# Patient Record
Sex: MALE | Race: BLACK OR AFRICAN AMERICAN | Employment: FULL TIME | URBAN - METROPOLITAN AREA
[De-identification: names, ages, dates, MRNs, and addresses within clinical notes are randomized per-mention and may not be internally consistent; named-entity substitution may affect disease eponyms.]

---

## 2017-02-13 ENCOUNTER — TELEPHONE (OUTPATIENT)
Dept: INTERNAL MEDICINE CLINIC | Facility: CLINIC | Age: 42
End: 2017-02-13

## 2017-02-21 ENCOUNTER — OFFICE VISIT (OUTPATIENT)
Dept: INTERNAL MEDICINE CLINIC | Facility: CLINIC | Age: 42
End: 2017-02-21

## 2017-02-21 VITALS
HEART RATE: 94 BPM | OXYGEN SATURATION: 98 % | TEMPERATURE: 98 F | HEIGHT: 75 IN | WEIGHT: 315 LBS | BODY MASS INDEX: 39.17 KG/M2 | SYSTOLIC BLOOD PRESSURE: 136 MMHG | RESPIRATION RATE: 16 BRPM | DIASTOLIC BLOOD PRESSURE: 82 MMHG

## 2017-02-21 DIAGNOSIS — R19.8 PAIN ASSOCIATED WITH DEFECATION: ICD-10-CM

## 2017-02-21 DIAGNOSIS — Z00.00 ROUTINE GENERAL MEDICAL EXAMINATION AT A HEALTH CARE FACILITY: Primary | ICD-10-CM

## 2017-02-21 DIAGNOSIS — E11.22 UNCONTROLLED TYPE 2 DIABETES MELLITUS WITH STAGE 3 CHRONIC KIDNEY DISEASE, WITHOUT LONG-TERM CURRENT USE OF INSULIN (HCC): ICD-10-CM

## 2017-02-21 DIAGNOSIS — E11.65 UNCONTROLLED TYPE 2 DIABETES MELLITUS WITH STAGE 3 CHRONIC KIDNEY DISEASE, WITHOUT LONG-TERM CURRENT USE OF INSULIN (HCC): ICD-10-CM

## 2017-02-21 DIAGNOSIS — Z23 NEED FOR VACCINATION FOR STREP PNEUMONIAE: ICD-10-CM

## 2017-02-21 DIAGNOSIS — E66.01 MORBID OBESITY WITH BMI OF 40.0-44.9, ADULT (HCC): ICD-10-CM

## 2017-02-21 DIAGNOSIS — N18.30 UNCONTROLLED TYPE 2 DIABETES MELLITUS WITH STAGE 3 CHRONIC KIDNEY DISEASE, WITHOUT LONG-TERM CURRENT USE OF INSULIN (HCC): ICD-10-CM

## 2017-02-21 PROBLEM — IMO0002 UNCONTROLLED TYPE 2 DIABETES MELLITUS WITH STAGE 3 CHRONIC KIDNEY DISEASE, WITHOUT LONG-TERM CURRENT USE OF INSULIN: Status: ACTIVE | Noted: 2017-02-21

## 2017-02-21 PROCEDURE — 99395 PREV VISIT EST AGE 18-39: CPT | Performed by: INTERNAL MEDICINE

## 2017-02-21 PROCEDURE — 90471 IMMUNIZATION ADMIN: CPT | Performed by: INTERNAL MEDICINE

## 2017-02-21 PROCEDURE — 93000 ELECTROCARDIOGRAM COMPLETE: CPT | Performed by: INTERNAL MEDICINE

## 2017-02-21 PROCEDURE — 90732 PPSV23 VACC 2 YRS+ SUBQ/IM: CPT | Performed by: INTERNAL MEDICINE

## 2017-02-21 NOTE — PROGRESS NOTES
Patient presents with:  Physical      HPI: The pt presents today for male CPX. Doing well. Due for wellness labs. Was diagnoses w/ uncontrolled DM2 w/ Stage 3 CKD, not on insulin, based on previous labs. Due for updated labs.   Due to see his [employer] [facility])                          02/21/2017        PE:  /82 mmHg  Pulse 94  Temp(Src) 98.3 °F (36.8 °C) (Oral)  Resp 16  Ht 75\"  Wt 322 lb 8 oz  BMI 40.31 kg/m2  SpO2 98%  Wt Readings from Last 6 Encounters:  02/21/17 : 322 lb 8 oz  12/12/16 : 321 lb Send to colorectal surgery for eval.  6. RTC 1 month for weight loss f/u. Patient verbally agrees to and understands the plan as outlined above.   Patient was also afforded the time and opportunity to ask questions, which were then answered to the best of

## 2017-03-04 ENCOUNTER — HOSPITAL ENCOUNTER (OUTPATIENT)
Dept: MRI IMAGING | Age: 42
Discharge: HOME | End: 2017-03-04
Attending: ORTHOPAEDIC SURGERY
Payer: COMMERCIAL

## 2017-03-04 DIAGNOSIS — M25.561 RIGHT KNEE PAIN, UNSPECIFIED CHRONICITY: ICD-10-CM

## 2017-03-04 PROCEDURE — 73721 MRI JNT OF LWR EXTRE W/O DYE: CPT

## 2017-03-15 ENCOUNTER — LAB ENCOUNTER (OUTPATIENT)
Dept: LAB | Age: 42
End: 2017-03-15
Attending: INTERNAL MEDICINE
Payer: COMMERCIAL

## 2017-03-15 DIAGNOSIS — E11.22 UNCONTROLLED TYPE 2 DIABETES MELLITUS WITH STAGE 3 CHRONIC KIDNEY DISEASE, WITHOUT LONG-TERM CURRENT USE OF INSULIN (HCC): ICD-10-CM

## 2017-03-15 DIAGNOSIS — E66.01 MORBID OBESITY WITH BMI OF 40.0-44.9, ADULT (HCC): ICD-10-CM

## 2017-03-15 DIAGNOSIS — Z00.00 ROUTINE GENERAL MEDICAL EXAMINATION AT A HEALTH CARE FACILITY: ICD-10-CM

## 2017-03-15 DIAGNOSIS — N18.30 UNCONTROLLED TYPE 2 DIABETES MELLITUS WITH STAGE 3 CHRONIC KIDNEY DISEASE, WITHOUT LONG-TERM CURRENT USE OF INSULIN (HCC): ICD-10-CM

## 2017-03-15 DIAGNOSIS — E11.65 UNCONTROLLED TYPE 2 DIABETES MELLITUS WITH STAGE 3 CHRONIC KIDNEY DISEASE, WITHOUT LONG-TERM CURRENT USE OF INSULIN (HCC): ICD-10-CM

## 2017-03-15 LAB
25-HYDROXYVITAMIN D (TOTAL): 14.2 NG/ML (ref 30–100)
ALBUMIN SERPL-MCNC: 3.9 G/DL (ref 3.5–4.8)
ALP LIVER SERPL-CCNC: 46 U/L (ref 45–117)
ALT SERPL-CCNC: 19 U/L (ref 17–63)
AST SERPL-CCNC: 13 U/L (ref 15–41)
BASOPHILS # BLD AUTO: 0.05 X10(3) UL (ref 0–0.1)
BASOPHILS NFR BLD AUTO: 0.8 %
BILIRUB SERPL-MCNC: 0.7 MG/DL (ref 0.1–2)
BILIRUB UR QL STRIP.AUTO: NEGATIVE
BUN BLD-MCNC: 14 MG/DL (ref 8–20)
CALCIUM BLD-MCNC: 9.3 MG/DL (ref 8.3–10.3)
CHLORIDE: 107 MMOL/L (ref 101–111)
CHOLEST SMN-MCNC: 142 MG/DL (ref ?–200)
CLARITY UR REFRACT.AUTO: CLEAR
CO2: 28 MMOL/L (ref 22–32)
COLOR UR AUTO: YELLOW
CREAT BLD-MCNC: 1.51 MG/DL (ref 0.7–1.3)
CREAT UR-SCNC: 471 MG/DL
EOSINOPHIL # BLD AUTO: 0.14 X10(3) UL (ref 0–0.3)
EOSINOPHIL NFR BLD AUTO: 2.3 %
ERYTHROCYTE [DISTWIDTH] IN BLOOD BY AUTOMATED COUNT: 12.1 % (ref 11.5–16)
EST. AVERAGE GLUCOSE BLD GHB EST-MCNC: 243 MG/DL (ref 68–126)
GLUCOSE BLD-MCNC: 103 MG/DL (ref 70–99)
GLUCOSE UR STRIP.AUTO-MCNC: NEGATIVE MG/DL
HBA1C MFR BLD HPLC: 10.1 % (ref ?–5.7)
HCT VFR BLD AUTO: 43.9 % (ref 37–53)
HDLC SERPL-MCNC: 42 MG/DL (ref 45–?)
HDLC SERPL: 3.38 {RATIO} (ref ?–4.97)
HGB BLD-MCNC: 14.3 G/DL (ref 13–17)
IMMATURE GRANULOCYTE COUNT: 0.02 X10(3) UL (ref 0–1)
IMMATURE GRANULOCYTE RATIO %: 0.3 %
LDLC SERPL CALC-MCNC: 90 MG/DL (ref ?–130)
LEUKOCYTE ESTERASE UR QL STRIP.AUTO: NEGATIVE
LYMPHOCYTES # BLD AUTO: 2.7 X10(3) UL (ref 0.9–4)
LYMPHOCYTES NFR BLD AUTO: 44 %
M PROTEIN MFR SERPL ELPH: 7.5 G/DL (ref 6.1–8.3)
MCH RBC QN AUTO: 29.5 PG (ref 27–33.2)
MCHC RBC AUTO-ENTMCNC: 32.6 G/DL (ref 31–37)
MCV RBC AUTO: 90.7 FL (ref 80–99)
MICROALBUMIN UR-MCNC: 2.65 MG/DL
MICROALBUMIN/CREAT 24H UR-RTO: 5.6 UG/MG (ref ?–30)
MONOCYTES # BLD AUTO: 0.53 X10(3) UL (ref 0.1–0.6)
MONOCYTES NFR BLD AUTO: 8.6 %
NEUTROPHIL ABS PRELIM: 2.69 X10 (3) UL (ref 1.3–6.7)
NEUTROPHILS # BLD AUTO: 2.69 X10(3) UL (ref 1.3–6.7)
NEUTROPHILS NFR BLD AUTO: 44 %
NITRITE UR QL STRIP.AUTO: NEGATIVE
NONHDLC SERPL-MCNC: 100 MG/DL (ref ?–130)
PH UR STRIP.AUTO: 5 [PH] (ref 4.5–8)
PLATELET # BLD AUTO: 274 10(3)UL (ref 150–450)
POTASSIUM SERPL-SCNC: 4.2 MMOL/L (ref 3.6–5.1)
PROT UR STRIP.AUTO-MCNC: NEGATIVE MG/DL
RBC # BLD AUTO: 4.84 X10(6)UL (ref 4.3–5.7)
RBC UR QL AUTO: NEGATIVE
RED CELL DISTRIBUTION WIDTH-SD: 39.8 FL (ref 35.1–46.3)
SODIUM SERPL-SCNC: 143 MMOL/L (ref 136–144)
SP GR UR STRIP.AUTO: 1.03 (ref 1–1.03)
TRIGLYCERIDES: 48 MG/DL (ref ?–150)
TSI SER-ACNC: 0.65 MIU/ML (ref 0.35–5.5)
UROBILINOGEN UR STRIP.AUTO-MCNC: <2 MG/DL
VLDL: 10 MG/DL (ref 5–40)
WBC # BLD AUTO: 6.1 X10(3) UL (ref 4–13)

## 2017-03-15 PROCEDURE — 80053 COMPREHEN METABOLIC PANEL: CPT

## 2017-03-15 PROCEDURE — 85025 COMPLETE CBC W/AUTO DIFF WBC: CPT

## 2017-03-15 PROCEDURE — 83036 HEMOGLOBIN GLYCOSYLATED A1C: CPT

## 2017-03-15 PROCEDURE — 82306 VITAMIN D 25 HYDROXY: CPT

## 2017-03-15 PROCEDURE — 82043 UR ALBUMIN QUANTITATIVE: CPT

## 2017-03-15 PROCEDURE — 80061 LIPID PANEL: CPT

## 2017-03-15 PROCEDURE — 84443 ASSAY THYROID STIM HORMONE: CPT

## 2017-03-15 PROCEDURE — 36415 COLL VENOUS BLD VENIPUNCTURE: CPT

## 2017-03-15 PROCEDURE — 81003 URINALYSIS AUTO W/O SCOPE: CPT

## 2017-03-15 PROCEDURE — 82570 ASSAY OF URINE CREATININE: CPT

## 2017-03-16 DIAGNOSIS — N18.30 UNCONTROLLED TYPE 2 DIABETES MELLITUS WITH STAGE 3 CHRONIC KIDNEY DISEASE, WITHOUT LONG-TERM CURRENT USE OF INSULIN (HCC): ICD-10-CM

## 2017-03-16 DIAGNOSIS — Z51.81 THERAPEUTIC DRUG MONITORING: ICD-10-CM

## 2017-03-16 DIAGNOSIS — E66.01 MORBID OBESITY WITH BMI OF 40.0-44.9, ADULT (HCC): Primary | ICD-10-CM

## 2017-03-16 DIAGNOSIS — E11.65 UNCONTROLLED TYPE 2 DIABETES MELLITUS WITH STAGE 3 CHRONIC KIDNEY DISEASE, WITHOUT LONG-TERM CURRENT USE OF INSULIN (HCC): ICD-10-CM

## 2017-03-16 DIAGNOSIS — E11.22 UNCONTROLLED TYPE 2 DIABETES MELLITUS WITH STAGE 3 CHRONIC KIDNEY DISEASE, WITHOUT LONG-TERM CURRENT USE OF INSULIN (HCC): ICD-10-CM

## 2017-03-16 RX ORDER — PHENTERMINE HYDROCHLORIDE 37.5 MG/1
37.5 TABLET ORAL
Qty: 30 TABLET | Refills: 0 | Status: SHIPPED | OUTPATIENT
Start: 2017-03-16 | End: 2017-05-30

## 2017-03-16 NOTE — PROGRESS NOTES
Script for Phentermine printed for weight loss. Patient will be seeing Socrates zarco at [facility] for eval and referral placed. Catracho Logan. Olga Gomez MD  Diplomate, American Board of Internal Medicine  [facility]  [address]

## 2017-04-25 ENCOUNTER — TELEPHONE (OUTPATIENT)
Dept: INTERNAL MEDICINE CLINIC | Facility: CLINIC | Age: 42
End: 2017-04-25

## 2017-04-25 NOTE — TELEPHONE ENCOUNTER
Called patient regarding today's missed appointment. Patient stated he had a work emergency come up last minute. Explained our 24-hour cancellation notice and stated that this will still be considered a NO SHOW.   Being that this is his third no show within

## 2017-05-08 ENCOUNTER — OFFICE VISIT (OUTPATIENT)
Dept: SURGERY | Facility: CLINIC | Age: 42
End: 2017-05-08

## 2017-05-08 VITALS
WEIGHT: 313 LBS | HEIGHT: 75 IN | BODY MASS INDEX: 38.92 KG/M2 | HEART RATE: 94 BPM | SYSTOLIC BLOOD PRESSURE: 165 MMHG | DIASTOLIC BLOOD PRESSURE: 72 MMHG

## 2017-05-08 DIAGNOSIS — E11.65 UNCONTROLLED TYPE 2 DIABETES MELLITUS WITH STAGE 3 CHRONIC KIDNEY DISEASE, WITHOUT LONG-TERM CURRENT USE OF INSULIN (HCC): ICD-10-CM

## 2017-05-08 DIAGNOSIS — N18.30 UNCONTROLLED TYPE 2 DIABETES MELLITUS WITH STAGE 3 CHRONIC KIDNEY DISEASE, WITHOUT LONG-TERM CURRENT USE OF INSULIN (HCC): ICD-10-CM

## 2017-05-08 DIAGNOSIS — E66.01 MORBID OBESITY WITH BMI OF 40.0-44.9, ADULT (HCC): ICD-10-CM

## 2017-05-08 DIAGNOSIS — K60.0 ACUTE POSTERIOR ANAL FISSURE: Primary | ICD-10-CM

## 2017-05-08 DIAGNOSIS — K62.89 ANAL PAIN: ICD-10-CM

## 2017-05-08 DIAGNOSIS — K60.0 ACUTE ANTERIOR ANAL FISSURE: ICD-10-CM

## 2017-05-08 DIAGNOSIS — E11.22 UNCONTROLLED TYPE 2 DIABETES MELLITUS WITH STAGE 3 CHRONIC KIDNEY DISEASE, WITHOUT LONG-TERM CURRENT USE OF INSULIN (HCC): ICD-10-CM

## 2017-05-08 DIAGNOSIS — K59.04 FUNCTIONAL CONSTIPATION: ICD-10-CM

## 2017-05-08 PROCEDURE — 99244 OFF/OP CNSLTJ NEW/EST MOD 40: CPT | Performed by: COLON & RECTAL SURGERY

## 2017-05-08 RX ORDER — POLYETHYLENE GLYCOL 3350, SODIUM CHLORIDE, SODIUM BICARBONATE, POTASSIUM CHLORIDE 420; 11.2; 5.72; 1.48 G/4L; G/4L; G/4L; G/4L
POWDER, FOR SOLUTION ORAL
Qty: 1 BOTTLE | Refills: 0 | Status: SHIPPED | OUTPATIENT
Start: 2017-05-08 | End: 2017-10-04

## 2017-05-08 RX ORDER — LISINOPRIL 5 MG/1
TABLET ORAL
COMMUNITY
Start: 2013-09-04 | End: 2017-09-28

## 2017-05-08 NOTE — H&P
New Patient Visit Note       Active Problems      1. Acute posterior anal fissure    2. Acute anterior anal fissure    3. Anal pain    4. Functional constipation    5. Morbid obesity with BMI of 40.0-44.9, adult ([facility]    6.  Uncontrolled type 2 diabetes celestine Family History    The past medical and past surgical history have been reviewed by me today. History reviewed. No pertinent past medical history.       Past Surgical History    ELBOW SURGERY Right age 11    Comment pins placed    APPENDECTOMY      KNEE SMART hearing loss, nosebleeds, sore throat and trouble swallowing. Respiratory: Negative for apnea, cough, shortness of breath and wheezing. Cardiovascular: Negative for chest pain, palpitations and leg swelling.    Gastrointestinal: Negative for nausea, v changes to the skin, no lichenification.                Assessment   Acute posterior anal fissure  (primary encounter diagnosis)  Acute anterior anal fissure  Anal pain  Functional constipation  Morbid obesity with BMI of 40.0-44.9, adult ([facility]  Uncontrolle seen of the mucosal surface and anal skin does not appear to be consistent with Crohn's disease or proctitis. No anoscopy was performed secondary to the patient's pain on digital rectal exam.    The patient also has evidence of pruritus ani.   There are mi

## 2017-05-08 NOTE — PATIENT INSTRUCTIONS
This patient presents with severe anal pain. I am seeing him in consultation from the primary care service, Dr. Nitish Hudson. He states that for 1 year, he has pain and soreness. It is 1 hour after bowel movement. It is throbbing at 10/10.   It lasts for 1 dietary and hygienic modifications that should improve his symptoms. We also gave him a prescription for nitroglycerin ointment. This will help the fissures. The patient will undergo colonoscopy in approximately 6-8 weeks.   I will reevaluate his teo

## 2017-05-10 ENCOUNTER — TELEPHONE (OUTPATIENT)
Dept: SURGERY | Facility: CLINIC | Age: 42
End: 2017-05-10

## 2017-05-30 ENCOUNTER — OFFICE VISIT (OUTPATIENT)
Dept: INTERNAL MEDICINE CLINIC | Facility: CLINIC | Age: 42
End: 2017-05-30

## 2017-05-30 VITALS
WEIGHT: 315 LBS | TEMPERATURE: 98 F | RESPIRATION RATE: 17 BRPM | HEART RATE: 72 BPM | BODY MASS INDEX: 39.17 KG/M2 | HEIGHT: 75 IN | SYSTOLIC BLOOD PRESSURE: 130 MMHG | DIASTOLIC BLOOD PRESSURE: 82 MMHG

## 2017-05-30 DIAGNOSIS — E66.01 MORBID OBESITY WITH BMI OF 40.0-44.9, ADULT (HCC): Primary | ICD-10-CM

## 2017-05-30 DIAGNOSIS — Z51.81 THERAPEUTIC DRUG MONITORING: ICD-10-CM

## 2017-05-30 PROCEDURE — 99213 OFFICE O/P EST LOW 20 MIN: CPT | Performed by: INTERNAL MEDICINE

## 2017-05-30 RX ORDER — PHENTERMINE HYDROCHLORIDE 37.5 MG/1
37.5 TABLET ORAL
Qty: 30 TABLET | Refills: 0 | Status: SHIPPED | OUTPATIENT
Start: 2017-05-30 | End: 2017-06-29

## 2017-05-30 NOTE — PROGRESS NOTES
Patient presents with:  Weight Check       HPI: The pt presents today for physician-supervised medical weight loss programming and assessment for the diagnosis of overweight and/or obesity status.   Has been on FDA-approved prescription medication with Phen Alcohol Use: Yes               PE:  /82 mmHg  Pulse 72  Temp(Src) 98.1 °F (36.7 °C) (Oral)  Resp 17  Ht 75\"  Wt 320 lb 4 oz  BMI 40.03 kg/m2  Wt Readings from Last 6 Encounters:  05/30/17 : 320 lb 4 oz  05/08/17 : 313 lb  02/21/17 : 322 lb 8 oz  12/

## 2017-06-05 ENCOUNTER — TELEPHONE (OUTPATIENT)
Dept: SURGERY | Facility: CLINIC | Age: 42
End: 2017-06-05

## 2017-09-28 ENCOUNTER — OFFICE VISIT (OUTPATIENT)
Dept: INTERNAL MEDICINE CLINIC | Facility: CLINIC | Age: 42
End: 2017-09-28

## 2017-09-28 VITALS
BODY MASS INDEX: 39.17 KG/M2 | HEART RATE: 72 BPM | TEMPERATURE: 98 F | HEIGHT: 75 IN | WEIGHT: 315 LBS | OXYGEN SATURATION: 99 % | RESPIRATION RATE: 16 BRPM | SYSTOLIC BLOOD PRESSURE: 138 MMHG | DIASTOLIC BLOOD PRESSURE: 80 MMHG

## 2017-09-28 DIAGNOSIS — E11.22 UNCONTROLLED TYPE 2 DIABETES MELLITUS WITH STAGE 3 CHRONIC KIDNEY DISEASE, WITHOUT LONG-TERM CURRENT USE OF INSULIN (HCC): Primary | ICD-10-CM

## 2017-09-28 DIAGNOSIS — E11.65 UNCONTROLLED TYPE 2 DIABETES MELLITUS WITH STAGE 3 CHRONIC KIDNEY DISEASE, WITHOUT LONG-TERM CURRENT USE OF INSULIN (HCC): Primary | ICD-10-CM

## 2017-09-28 DIAGNOSIS — N18.30 UNCONTROLLED TYPE 2 DIABETES MELLITUS WITH STAGE 3 CHRONIC KIDNEY DISEASE, WITHOUT LONG-TERM CURRENT USE OF INSULIN (HCC): Primary | ICD-10-CM

## 2017-09-28 DIAGNOSIS — E66.01 SEVERE OBESITY (BMI 35.0-35.9 WITH COMORBIDITY) (HCC): ICD-10-CM

## 2017-09-28 PROCEDURE — 99214 OFFICE O/P EST MOD 30 MIN: CPT | Performed by: PHYSICIAN ASSISTANT

## 2017-09-28 NOTE — PATIENT INSTRUCTIONS
Diabetes:  - increase metformin to 1,000 mg TWICE a day with food  - low carb diet  - exercise at least 30 minutes each day  - drink at least 60 ounces of water daily    **see Ria Bourne for diabetes    **see Dr. Roe Jarrett for a DIABETIC eye exam

## 2017-09-28 NOTE — PROGRESS NOTES
Neli Koch is a 38year old male. HPI:   Patient presents for f/u of DM. 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 Takes metformin once daily. Does not check BS. Diet is not particularly low carb. Min exercise. no cyanosis, clubbing, or edema  NEURO: A&O x3, appropriate mood and affect    ASSESSMENT AND PLAN:   # Uncontrolled type 2 DM: last a1c 10.1, due for repeat. Will increase metformin to 1,000 mg BID and f/u with Akila Cazares.   - not on statin or ace/arb

## 2017-09-29 ENCOUNTER — APPOINTMENT (OUTPATIENT)
Dept: LAB | Age: 42
End: 2017-09-29
Attending: PHYSICIAN ASSISTANT

## 2017-09-29 DIAGNOSIS — E11.22 UNCONTROLLED TYPE 2 DIABETES MELLITUS WITH STAGE 3 CHRONIC KIDNEY DISEASE, WITHOUT LONG-TERM CURRENT USE OF INSULIN (HCC): ICD-10-CM

## 2017-09-29 DIAGNOSIS — N18.30 UNCONTROLLED TYPE 2 DIABETES MELLITUS WITH STAGE 3 CHRONIC KIDNEY DISEASE, WITHOUT LONG-TERM CURRENT USE OF INSULIN (HCC): ICD-10-CM

## 2017-09-29 DIAGNOSIS — E11.65 UNCONTROLLED TYPE 2 DIABETES MELLITUS WITH STAGE 3 CHRONIC KIDNEY DISEASE, WITHOUT LONG-TERM CURRENT USE OF INSULIN (HCC): ICD-10-CM

## 2017-09-29 DIAGNOSIS — E66.01 SEVERE OBESITY (BMI 35.0-35.9 WITH COMORBIDITY) (HCC): ICD-10-CM

## 2017-09-29 LAB
ALT SERPL-CCNC: 19 U/L (ref 17–63)
AST SERPL-CCNC: 14 U/L (ref 15–41)
BUN BLD-MCNC: 19 MG/DL (ref 8–20)
CALCIUM BLD-MCNC: 8.4 MG/DL (ref 8.3–10.3)
CHLORIDE: 103 MMOL/L (ref 101–111)
CHOLEST SMN-MCNC: 167 MG/DL (ref ?–200)
CO2: 27 MMOL/L (ref 22–32)
CREAT BLD-MCNC: 1.39 MG/DL (ref 0.7–1.3)
CREAT UR-SCNC: 242 MG/DL
EST. AVERAGE GLUCOSE BLD GHB EST-MCNC: 252 MG/DL (ref 68–126)
GLUCOSE BLD-MCNC: 179 MG/DL (ref 70–99)
HBA1C MFR BLD HPLC: 10.4 % (ref ?–5.7)
HDLC SERPL-MCNC: 45 MG/DL (ref 45–?)
HDLC SERPL: 3.71 {RATIO} (ref ?–4.97)
LDLC SERPL CALC-MCNC: 108 MG/DL (ref ?–130)
LDLC SERPL-MCNC: 14 MG/DL (ref 5–40)
MICROALBUMIN UR-MCNC: 0.69 MG/DL
MICROALBUMIN/CREAT 24H UR-RTO: 2.9 UG/MG (ref ?–30)
NONHDLC SERPL-MCNC: 122 MG/DL (ref ?–130)
POTASSIUM SERPL-SCNC: 4.1 MMOL/L (ref 3.6–5.1)
SODIUM SERPL-SCNC: 137 MMOL/L (ref 136–144)
TRIGLYCERIDES: 69 MG/DL (ref ?–150)

## 2017-09-29 PROCEDURE — 36415 COLL VENOUS BLD VENIPUNCTURE: CPT

## 2017-09-29 PROCEDURE — 80048 BASIC METABOLIC PNL TOTAL CA: CPT

## 2017-09-29 PROCEDURE — 84460 ALANINE AMINO (ALT) (SGPT): CPT

## 2017-09-29 PROCEDURE — 82043 UR ALBUMIN QUANTITATIVE: CPT

## 2017-09-29 PROCEDURE — 84450 TRANSFERASE (AST) (SGOT): CPT

## 2017-09-29 PROCEDURE — 83036 HEMOGLOBIN GLYCOSYLATED A1C: CPT

## 2017-09-29 PROCEDURE — 80061 LIPID PANEL: CPT

## 2017-09-29 PROCEDURE — 82570 ASSAY OF URINE CREATININE: CPT

## 2017-10-04 ENCOUNTER — OFFICE VISIT (OUTPATIENT)
Dept: ENDOCRINOLOGY CLINIC | Facility: CLINIC | Age: 42
End: 2017-10-04

## 2017-10-04 VITALS
SYSTOLIC BLOOD PRESSURE: 120 MMHG | HEIGHT: 75 IN | RESPIRATION RATE: 18 BRPM | HEART RATE: 72 BPM | DIASTOLIC BLOOD PRESSURE: 80 MMHG | TEMPERATURE: 98 F | BODY MASS INDEX: 39.17 KG/M2 | WEIGHT: 315 LBS

## 2017-10-04 DIAGNOSIS — N18.30 UNCONTROLLED TYPE 2 DIABETES MELLITUS WITH STAGE 3 CHRONIC KIDNEY DISEASE, WITHOUT LONG-TERM CURRENT USE OF INSULIN (HCC): Primary | ICD-10-CM

## 2017-10-04 DIAGNOSIS — E11.65 UNCONTROLLED TYPE 2 DIABETES MELLITUS WITH STAGE 3 CHRONIC KIDNEY DISEASE, WITHOUT LONG-TERM CURRENT USE OF INSULIN (HCC): Primary | ICD-10-CM

## 2017-10-04 DIAGNOSIS — E11.22 UNCONTROLLED TYPE 2 DIABETES MELLITUS WITH STAGE 3 CHRONIC KIDNEY DISEASE, WITHOUT LONG-TERM CURRENT USE OF INSULIN (HCC): Primary | ICD-10-CM

## 2017-10-04 PROCEDURE — 99214 OFFICE O/P EST MOD 30 MIN: CPT | Performed by: NURSE PRACTITIONER

## 2017-10-04 NOTE — PROGRESS NOTES
Diabetes Education:    Glucose Testing:  Juli has not been checking his glucose at home, though he stated that he has a meter. Instructed him on how to use [employer] Next One meter and how to download the free emanuel.   He agreed to download to his ph

## 2017-10-04 NOTE — PROGRESS NOTES
CC: Patient presents with:  Diabetes: new pt. referred by PCP. pt has meter at home but he does not check his sugar.       HISTORY:  Past Medical History:   Diagnosis Date   • Diabetes ([facility]       Past Surgical History:  No date: APPENDECTOMY  age 11: ELBOW Negative for cough, chest tightness, shortness of breath and wheezing. Cardiovascular: Negative for chest pain, palpitations and leg swelling. ASA none daily. Gastrointestinal: Negative for  vomiting, abdominal pain,  and abdominal distention.  + for int and affect.      Assessment and Plan:  Uncontrolled type 2 diabetes mellitus with stage 3 chronic kidney disease, without long-term current use of insulin (hcc)  (primary encounter diagnosis): plan continue metformin 1000 mg bid with food if GI SE do not liriano

## 2017-10-04 NOTE — PATIENT INSTRUCTIONS
Plan test sugar 3 times daily   Continue metformin 2 times/ day WITH food  Start Basaglar insulin 12 units nightly,if morning readings are not under 130 mg/dl   for 3-4 days increase by 2 units every 3 days until morning readings are in this range.    Retur

## 2017-10-18 ENCOUNTER — OFFICE VISIT (OUTPATIENT)
Dept: ENDOCRINOLOGY CLINIC | Facility: CLINIC | Age: 42
End: 2017-10-18

## 2017-10-18 VITALS
SYSTOLIC BLOOD PRESSURE: 120 MMHG | HEART RATE: 80 BPM | HEIGHT: 75 IN | TEMPERATURE: 98 F | WEIGHT: 315 LBS | BODY MASS INDEX: 39.17 KG/M2 | DIASTOLIC BLOOD PRESSURE: 72 MMHG | RESPIRATION RATE: 18 BRPM

## 2017-10-18 DIAGNOSIS — E11.65 UNCONTROLLED TYPE 2 DIABETES MELLITUS WITH STAGE 3 CHRONIC KIDNEY DISEASE, WITHOUT LONG-TERM CURRENT USE OF INSULIN (HCC): Primary | ICD-10-CM

## 2017-10-18 DIAGNOSIS — N18.30 UNCONTROLLED TYPE 2 DIABETES MELLITUS WITH STAGE 3 CHRONIC KIDNEY DISEASE, WITHOUT LONG-TERM CURRENT USE OF INSULIN (HCC): Primary | ICD-10-CM

## 2017-10-18 DIAGNOSIS — E11.22 UNCONTROLLED TYPE 2 DIABETES MELLITUS WITH STAGE 3 CHRONIC KIDNEY DISEASE, WITHOUT LONG-TERM CURRENT USE OF INSULIN (HCC): Primary | ICD-10-CM

## 2017-10-18 DIAGNOSIS — E11.69 HYPERLIPIDEMIA ASSOCIATED WITH TYPE 2 DIABETES MELLITUS (HCC): ICD-10-CM

## 2017-10-18 DIAGNOSIS — E78.5 HYPERLIPIDEMIA ASSOCIATED WITH TYPE 2 DIABETES MELLITUS (HCC): ICD-10-CM

## 2017-10-18 PROCEDURE — 99214 OFFICE O/P EST MOD 30 MIN: CPT | Performed by: NURSE PRACTITIONER

## 2017-10-18 RX ORDER — PRAVASTATIN SODIUM 10 MG
10 TABLET ORAL NIGHTLY
Qty: 90 TABLET | Refills: 0 | Status: SHIPPED | OUTPATIENT
Start: 2017-10-18 | End: 2017-12-12

## 2017-10-18 NOTE — PROGRESS NOTES
CC: Patient presents with:  Diabetes: follow up. pt has readings on his phone emanuel.       HISTORY:  Past Medical History:   Diagnosis Date   • Diabetes ([facility]       Past Surgical History:  No date: APPENDECTOMY  age 11: ELBOW SURGERY Right      Comment: ibrahima carvalho dysuria, discharge, and difficulty urinating. Musculoskeletal: Negative for myalgias, and gait problem. Skin:  Negative for skin lesions, ulcers, wounds or calluses. Neurological: Negative for , numbness, tingling or ED issues.    Psychiatric/Behaviora insulin (hcc)  (primary encounter diagnosis): plan continue metformin 1000 mg bid with food to remain on this and if trends shift can add glp1 or SGLT2 if needed. To have labs in 1 month and see PCP. If labs stable to return in 3 months.   Hyperlipidemia: s

## 2017-11-07 NOTE — TELEPHONE ENCOUNTER
Requesting   LOV: 9-28-17  RTC: was asked to see [place] Ruby Relevant Labs: 9-29-17  Filled: 9-28-17 #180 with 1 refills    Future Appointments  Date Time Provider Fito Talisha   12/12/2017 3:30 PM Mark Richardson MD [facility] [place]

## 2017-12-12 ENCOUNTER — OFFICE VISIT (OUTPATIENT)
Dept: INTERNAL MEDICINE CLINIC | Facility: CLINIC | Age: 42
End: 2017-12-12

## 2017-12-12 VITALS
TEMPERATURE: 98 F | RESPIRATION RATE: 15 BRPM | SYSTOLIC BLOOD PRESSURE: 138 MMHG | DIASTOLIC BLOOD PRESSURE: 92 MMHG | WEIGHT: 315 LBS | HEIGHT: 75 IN | BODY MASS INDEX: 39.17 KG/M2 | HEART RATE: 60 BPM

## 2017-12-12 DIAGNOSIS — Z23 FLU VACCINE NEED: ICD-10-CM

## 2017-12-12 DIAGNOSIS — E78.5 HYPERLIPIDEMIA ASSOCIATED WITH TYPE 2 DIABETES MELLITUS (HCC): ICD-10-CM

## 2017-12-12 DIAGNOSIS — E66.01 MORBID OBESITY WITH BMI OF 40.0-44.9, ADULT (HCC): Primary | ICD-10-CM

## 2017-12-12 DIAGNOSIS — N18.30 UNCONTROLLED TYPE 2 DIABETES MELLITUS WITH STAGE 3 CHRONIC KIDNEY DISEASE, WITHOUT LONG-TERM CURRENT USE OF INSULIN (HCC): ICD-10-CM

## 2017-12-12 DIAGNOSIS — E11.69 HYPERLIPIDEMIA ASSOCIATED WITH TYPE 2 DIABETES MELLITUS (HCC): ICD-10-CM

## 2017-12-12 DIAGNOSIS — E11.65 UNCONTROLLED TYPE 2 DIABETES MELLITUS WITH STAGE 3 CHRONIC KIDNEY DISEASE, WITHOUT LONG-TERM CURRENT USE OF INSULIN (HCC): ICD-10-CM

## 2017-12-12 DIAGNOSIS — E11.22 UNCONTROLLED TYPE 2 DIABETES MELLITUS WITH STAGE 3 CHRONIC KIDNEY DISEASE, WITHOUT LONG-TERM CURRENT USE OF INSULIN (HCC): ICD-10-CM

## 2017-12-12 PROBLEM — K60.0 ACUTE POSTERIOR ANAL FISSURE: Status: RESOLVED | Noted: 2017-05-08 | Resolved: 2017-12-12

## 2017-12-12 PROBLEM — K62.89 ANAL PAIN: Status: RESOLVED | Noted: 2017-05-08 | Resolved: 2017-12-12

## 2017-12-12 PROBLEM — K60.0 ACUTE ANTERIOR ANAL FISSURE: Status: RESOLVED | Noted: 2017-05-08 | Resolved: 2017-12-12

## 2017-12-12 PROCEDURE — 90686 IIV4 VACC NO PRSV 0.5 ML IM: CPT | Performed by: INTERNAL MEDICINE

## 2017-12-12 PROCEDURE — 99214 OFFICE O/P EST MOD 30 MIN: CPT | Performed by: INTERNAL MEDICINE

## 2017-12-12 PROCEDURE — 90471 IMMUNIZATION ADMIN: CPT | Performed by: INTERNAL MEDICINE

## 2017-12-12 RX ORDER — PRAVASTATIN SODIUM 10 MG
10 TABLET ORAL NIGHTLY
Qty: 90 TABLET | Refills: 1 | Status: SHIPPED | OUTPATIENT
Start: 2017-12-12 | End: 2018-04-09

## 2017-12-12 NOTE — PROGRESS NOTES
Patient presents with: Follow - Up: morbid obesity, diabetes, lipids  Other: due for flu shot      HPI: The pt presents today for 4-month f/u chronic medical conditions of morbid obesity, uncontrolled DM2, and dyslipidemia. He is also due for flu shot. Rfl: 5  •  MetFORMIN HCl 1000 MG Oral Tab, Take 1 tablet (1,000 mg total) by mouth 2 (two) times daily with meals. , Disp: 180 tablet, Rfl: 1  •  ibuprofen (MOTRIN) 400 MG Oral Tab, Take 400 mg by mouth every 6 (six) hours as needed for Pain., Disp: , Rfl: 100, 14 [facility]  T: [phone]; F: [phone]       Orders Placed This Encounter      Flulaval 0.5 ml >= 6 months Quad single dose Pres Free (77452)    Meds & Refills for this Visit:  Signed Prescriptions Disp Refills    Pravastatin Sodium 1

## 2017-12-19 ENCOUNTER — TELEPHONE (OUTPATIENT)
Dept: INTERNAL MEDICINE CLINIC | Facility: CLINIC | Age: 42
End: 2017-12-19

## 2017-12-20 NOTE — TELEPHONE ENCOUNTER
Call pt. The nitroglycerin ointment is not covered and will cost $650. I'm fine w/ him going without needing this medication. Melany Royal. Ivon Andujar MD  Diplomate, American Board of Internal Medicine  [address], [address] [place], [place] [address]

## 2018-04-09 ENCOUNTER — OFFICE VISIT (OUTPATIENT)
Dept: INTERNAL MEDICINE CLINIC | Facility: CLINIC | Age: 43
End: 2018-04-09

## 2018-04-09 VITALS
OXYGEN SATURATION: 97 % | DIASTOLIC BLOOD PRESSURE: 80 MMHG | RESPIRATION RATE: 18 BRPM | HEIGHT: 75 IN | SYSTOLIC BLOOD PRESSURE: 140 MMHG | HEART RATE: 87 BPM | TEMPERATURE: 99 F | BODY MASS INDEX: 37.58 KG/M2 | WEIGHT: 302.25 LBS

## 2018-04-09 DIAGNOSIS — E78.5 HYPERLIPIDEMIA ASSOCIATED WITH TYPE 2 DIABETES MELLITUS (HCC): ICD-10-CM

## 2018-04-09 DIAGNOSIS — I10 BENIGN ESSENTIAL HYPERTENSION: ICD-10-CM

## 2018-04-09 DIAGNOSIS — E11.65 UNCONTROLLED TYPE 2 DIABETES MELLITUS WITH HYPERGLYCEMIA, WITHOUT LONG-TERM CURRENT USE OF INSULIN (HCC): ICD-10-CM

## 2018-04-09 DIAGNOSIS — E11.69 HYPERLIPIDEMIA ASSOCIATED WITH TYPE 2 DIABETES MELLITUS (HCC): ICD-10-CM

## 2018-04-09 DIAGNOSIS — J06.9 VIRAL URI: Primary | ICD-10-CM

## 2018-04-09 DIAGNOSIS — E66.01 SEVERE OBESITY (BMI 35.0-39.9) WITH COMORBIDITY (HCC): ICD-10-CM

## 2018-04-09 PROCEDURE — 99214 OFFICE O/P EST MOD 30 MIN: CPT | Performed by: PHYSICIAN ASSISTANT

## 2018-04-09 RX ORDER — LOSARTAN POTASSIUM 100 MG/1
100 TABLET ORAL DAILY
Qty: 30 TABLET | Refills: 0 | Status: SHIPPED | OUTPATIENT
Start: 2018-04-09 | End: 2018-12-21

## 2018-04-09 NOTE — PATIENT INSTRUCTIONS
High Blood Pressure:  - start losartan 100 mg - 1 tablet daily    Please do your lab work ASAP. Remember to fast for 10-12 hours but drink plenty of water. Please do your DIABETIC eye exam ASAP -- [facility] or see Dr. Blade Renee.     Follow up visit a · Over-the-counter cold medicines will not shorten the length of time you’re sick, but they may be helpful for the following symptoms: cough, sore throat, and nasal and sinus congestion.  (Note: Do not use decongestants if you have high blood pressure.)  Fo

## 2018-04-09 NOTE — PROGRESS NOTES
HPI:  Brandon Nagy is a 38year old male who presents for URI symptoms x 5 days. C/o sore throat, hoarse voice, cough, chest congestion, mild SOB. Denies fever, chills, sweats, nasal congestion, sinus pressure, headache, or GI symptoms.   Taking tylenol denies dizziness, LH    EXAM:  /80   Pulse 87   Temp 98.7 °F (37.1 °C) (Oral)   Resp 18   Ht 75\"   Wt (!) 302 lb 4 oz   SpO2 97%   BMI 37.78 kg/m²   GENERAL: A&O, well developed, well nourished, in no acute distress  SKIN: no rashes, no suspicious l

## 2018-06-11 ENCOUNTER — TELEPHONE (OUTPATIENT)
Dept: INTERNAL MEDICINE CLINIC | Facility: CLINIC | Age: 43
End: 2018-06-11

## 2018-06-11 NOTE — TELEPHONE ENCOUNTER
Pt called stating he is a  and was injured today at work when a garbage can fell on him  Sustained injury to his mid back, shoulders and left ankle.   Employer sent him to occupational health where it was recommended he start physical thera

## 2018-06-11 NOTE — TELEPHONE ENCOUNTER
Patient was injured at work. Patient was in immediate care today and was recommended to go to therapy, but patient would like a recommendation from Dr Sukh Hua. Patient would also like to follow up with Dr Sukh Hua.  Please call patient

## 2018-06-11 NOTE — TELEPHONE ENCOUNTER
Agreed that employer should handle the PT request as this may be a potential workman's comp case. Natalie Sexton. Romana Soto MD  Diplomate, American Board of Internal Medicine  [facility]  [address] [place] [address],[address], [address], [facility], [address] [place] [address]  T: [phone]

## 2018-12-21 ENCOUNTER — LAB ENCOUNTER (OUTPATIENT)
Dept: LAB | Age: 43
End: 2018-12-21
Attending: PHYSICIAN ASSISTANT
Payer: MEDICAID

## 2018-12-21 ENCOUNTER — OFFICE VISIT (OUTPATIENT)
Dept: INTERNAL MEDICINE CLINIC | Facility: CLINIC | Age: 43
End: 2018-12-21
Payer: MEDICAID

## 2018-12-21 ENCOUNTER — HOSPITAL ENCOUNTER (OUTPATIENT)
Dept: GENERAL RADIOLOGY | Age: 43
Discharge: HOME | End: 2018-12-21
Attending: PHYSICIAN ASSISTANT
Payer: MEDICAID

## 2018-12-21 VITALS
SYSTOLIC BLOOD PRESSURE: 128 MMHG | TEMPERATURE: 98 F | HEIGHT: 75 IN | RESPIRATION RATE: 16 BRPM | HEART RATE: 68 BPM | BODY MASS INDEX: 39.17 KG/M2 | WEIGHT: 315 LBS | DIASTOLIC BLOOD PRESSURE: 80 MMHG

## 2018-12-21 DIAGNOSIS — E78.5 HYPERLIPIDEMIA ASSOCIATED WITH TYPE 2 DIABETES MELLITUS (HCC): ICD-10-CM

## 2018-12-21 DIAGNOSIS — E11.69 HYPERLIPIDEMIA ASSOCIATED WITH TYPE 2 DIABETES MELLITUS (HCC): ICD-10-CM

## 2018-12-21 DIAGNOSIS — N18.30 UNCONTROLLED TYPE 2 DIABETES MELLITUS WITH STAGE 3 CHRONIC KIDNEY DISEASE, WITHOUT LONG-TERM CURRENT USE OF INSULIN (HCC): ICD-10-CM

## 2018-12-21 DIAGNOSIS — E66.01 SEVERE OBESITY (BMI 35.0-39.9) WITH COMORBIDITY (HCC): ICD-10-CM

## 2018-12-21 DIAGNOSIS — E11.65 UNCONTROLLED TYPE 2 DIABETES MELLITUS WITH STAGE 3 CHRONIC KIDNEY DISEASE, WITHOUT LONG-TERM CURRENT USE OF INSULIN (HCC): ICD-10-CM

## 2018-12-21 DIAGNOSIS — E11.65 UNCONTROLLED TYPE 2 DIABETES MELLITUS WITH HYPERGLYCEMIA, WITHOUT LONG-TERM CURRENT USE OF INSULIN (HCC): ICD-10-CM

## 2018-12-21 DIAGNOSIS — Z01.818 PREOP EXAMINATION: Primary | ICD-10-CM

## 2018-12-21 DIAGNOSIS — E11.22 UNCONTROLLED TYPE 2 DIABETES MELLITUS WITH STAGE 3 CHRONIC KIDNEY DISEASE, WITHOUT LONG-TERM CURRENT USE OF INSULIN (HCC): ICD-10-CM

## 2018-12-21 PROCEDURE — 82043 UR ALBUMIN QUANTITATIVE: CPT

## 2018-12-21 PROCEDURE — 71046 X-RAY EXAM CHEST 2 VIEWS: CPT | Performed by: PHYSICIAN ASSISTANT

## 2018-12-21 PROCEDURE — 80053 COMPREHEN METABOLIC PANEL: CPT

## 2018-12-21 PROCEDURE — 83036 HEMOGLOBIN GLYCOSYLATED A1C: CPT | Performed by: PHYSICIAN ASSISTANT

## 2018-12-21 PROCEDURE — 93000 ELECTROCARDIOGRAM COMPLETE: CPT | Performed by: PHYSICIAN ASSISTANT

## 2018-12-21 PROCEDURE — 36415 COLL VENOUS BLD VENIPUNCTURE: CPT

## 2018-12-21 PROCEDURE — 80061 LIPID PANEL: CPT

## 2018-12-21 PROCEDURE — 85025 COMPLETE CBC W/AUTO DIFF WBC: CPT

## 2018-12-21 PROCEDURE — 99244 OFF/OP CNSLTJ NEW/EST MOD 40: CPT | Performed by: PHYSICIAN ASSISTANT

## 2018-12-21 PROCEDURE — 85730 THROMBOPLASTIN TIME PARTIAL: CPT

## 2018-12-21 PROCEDURE — 85610 PROTHROMBIN TIME: CPT

## 2018-12-21 PROCEDURE — 82570 ASSAY OF URINE CREATININE: CPT

## 2018-12-21 NOTE — PATIENT INSTRUCTIONS
Diabetes:  - continue metformin 1,000 mg twice a day with food  - resume Basaglar insulin - start at 10 units each night and increase by TWO units every TWO nights until fasting blood sugar in the morning is less than 100  - low carb diet  - exercise at [employer]

## 2018-12-21 NOTE — PROGRESS NOTES
Brett Polk is a 41year old male who presents for a Preop physical exam.   HPI:   Pt presents for preop H&P.   Request for medical clearance is made by Dr. Sp Head for perioperative risk assessment due to patient's multiple medical issues as outlined b unusual skin lesions  EYES: denies changes in vision  HEENT: denies nasal congestion, sore throat, sinus tenderness  LUNGS: denies shortness of breath, cough  CARDIOVASCULAR: denies chest pain, palpitations, KAPOOR, orthopnea  GI: denies abdominal pain, nause for f/u of DM. ADDENDUM 12/26/18:  Labs done 12/21/18 reviewed:  PT 16.4 / INR 1.27 -- will recheck a few weeks after surgery.  - rec starting atorvastatin 20 mg nightly. Check FLP, AST, ALT in 3 months.     Risk of cardiac death, nonfatal MI, a

## 2018-12-26 ENCOUNTER — TELEPHONE (OUTPATIENT)
Dept: INTERNAL MEDICINE CLINIC | Facility: CLINIC | Age: 43
End: 2018-12-26

## 2018-12-26 RX ORDER — ATORVASTATIN CALCIUM 20 MG/1
20 TABLET, FILM COATED ORAL NIGHTLY
Qty: 90 TABLET | Refills: 1 | Status: SHIPPED | OUTPATIENT
Start: 2018-12-26 | End: 2019-09-14

## 2018-12-26 NOTE — TELEPHONE ENCOUNTER
Left detailed message on [facility]'s voicemail informing we received confirmation on our end that H&P documents were faxed/received to their office and to call back office with any questions.

## 2018-12-26 NOTE — TELEPHONE ENCOUNTER
[facility] is calling back would like the info they requested to be sent over today

## 2018-12-26 NOTE — TELEPHONE ENCOUNTER
Pre-op paperwork (including labs, CXR, EKG, H&P) faxed by [facility]. Paperwork in Crystal's green folder at [employer].

## 2018-12-26 NOTE — TELEPHONE ENCOUNTER
[facility] would like pt last labs to get cleared for surgery faxed to office and would also like a call back to make sure they get sent over

## 2018-12-27 ENCOUNTER — TELEPHONE (OUTPATIENT)
Dept: INTERNAL MEDICINE CLINIC | Facility: CLINIC | Age: 43
End: 2018-12-27

## 2018-12-27 DIAGNOSIS — E11.69 HYPERLIPIDEMIA ASSOCIATED WITH TYPE 2 DIABETES MELLITUS (HCC): Primary | ICD-10-CM

## 2018-12-27 DIAGNOSIS — E78.5 HYPERLIPIDEMIA ASSOCIATED WITH TYPE 2 DIABETES MELLITUS (HCC): Primary | ICD-10-CM

## 2018-12-27 NOTE — TELEPHONE ENCOUNTER
----- Message from Danyel Rose, [address] sent at 12/26/2018  2:59 PM CST -----  (see below). Will order additional labs at his f/u visit with me in 3 weeks -- needs to see me for DM visit / insulin follow up.

## 2019-05-20 ENCOUNTER — OFFICE VISIT (OUTPATIENT)
Dept: INTERNAL MEDICINE CLINIC | Facility: CLINIC | Age: 44
End: 2019-05-20
Payer: MEDICAID

## 2019-05-20 ENCOUNTER — TELEPHONE (OUTPATIENT)
Dept: INTERNAL MEDICINE CLINIC | Facility: CLINIC | Age: 44
End: 2019-05-20

## 2019-05-20 VITALS
BODY MASS INDEX: 38.64 KG/M2 | HEIGHT: 75 IN | TEMPERATURE: 98 F | WEIGHT: 310.75 LBS | OXYGEN SATURATION: 98 % | RESPIRATION RATE: 16 BRPM | HEART RATE: 100 BPM | DIASTOLIC BLOOD PRESSURE: 78 MMHG | SYSTOLIC BLOOD PRESSURE: 126 MMHG

## 2019-05-20 DIAGNOSIS — E78.5 HYPERLIPIDEMIA ASSOCIATED WITH TYPE 2 DIABETES MELLITUS (HCC): ICD-10-CM

## 2019-05-20 DIAGNOSIS — E66.01 SEVERE OBESITY (BMI 35.0-39.9) WITH COMORBIDITY (HCC): ICD-10-CM

## 2019-05-20 DIAGNOSIS — E11.65 UNCONTROLLED TYPE 2 DIABETES MELLITUS WITH HYPERGLYCEMIA, WITH LONG-TERM CURRENT USE OF INSULIN (HCC): Primary | ICD-10-CM

## 2019-05-20 DIAGNOSIS — Z79.4 UNCONTROLLED TYPE 2 DIABETES MELLITUS WITH HYPERGLYCEMIA, WITH LONG-TERM CURRENT USE OF INSULIN (HCC): Primary | ICD-10-CM

## 2019-05-20 DIAGNOSIS — E11.69 HYPERLIPIDEMIA ASSOCIATED WITH TYPE 2 DIABETES MELLITUS (HCC): ICD-10-CM

## 2019-05-20 DIAGNOSIS — M54.9 UPPER BACK PAIN ON LEFT SIDE: ICD-10-CM

## 2019-05-20 PROBLEM — I10 BENIGN ESSENTIAL HYPERTENSION: Status: RESOLVED | Noted: 2018-04-09 | Resolved: 2019-05-20

## 2019-05-20 PROBLEM — K59.04 FUNCTIONAL CONSTIPATION: Status: RESOLVED | Noted: 2017-05-08 | Resolved: 2019-05-20

## 2019-05-20 PROCEDURE — 99214 OFFICE O/P EST MOD 30 MIN: CPT | Performed by: PHYSICIAN ASSISTANT

## 2019-05-20 PROCEDURE — 83036 HEMOGLOBIN GLYCOSYLATED A1C: CPT | Performed by: PHYSICIAN ASSISTANT

## 2019-05-20 NOTE — TELEPHONE ENCOUNTER
Records Requested from:    [employer] (Dialated Eye Exam)    Fax: [phone]    Confirmation was received. Copy of form made and placed in teal accordion file. Original form sent to scanning.

## 2019-05-20 NOTE — PATIENT INSTRUCTIONS
Please do your lab work ASAP. Remember to fast for 10-12 hours but drink plenty of water.     Diabetes:  - continue metformin 1,000 mg twice a day with food  - start Basaglar insulin -- 10 units each night    -- increase by TWO units every two nights until

## 2019-05-20 NOTE — PROGRESS NOTES
Artist Cancer is a 41year old male. HPI:   Patient presents for f/u of DM. Taking metformin BID. C/o nausea for 2-3 hours after each dose of metformin. Frequent loose stool. Rx Basaglar last visit but did not fill.   AM FBS in the 120-130s when he hematuria  NEURO: denies headaches, numbness, tingling  EXT: denies edema    EXAM:   /78 (BP Location: Left arm, Patient Position: Sitting, Cuff Size: large)   Pulse 100   Temp 98.4 °F (36.9 °C) (Oral)   Resp 16   Ht 75\"   Wt (!) 310 lb 12 oz   SpO2 here in 1 month for wellness visit.

## 2019-06-18 ENCOUNTER — TELEPHONE (OUTPATIENT)
Dept: INTERNAL MEDICINE CLINIC | Facility: CLINIC | Age: 44
End: 2019-06-18

## 2019-06-18 DIAGNOSIS — E66.01 SEVERE OBESITY (BMI 35.0-39.9) WITH COMORBIDITY (HCC): Primary | ICD-10-CM

## 2019-06-18 NOTE — TELEPHONE ENCOUNTER
Referral entered and pending approval through pts insurance company in [address] [place] [address]. Pt notified.

## 2019-06-18 NOTE — TELEPHONE ENCOUNTER
Dr. Mairlee Wesley does not accept patient insurance.   Pt contacted insurance company, referral needed to :    Dr. Elvie Blanco, [facility] - [phone]

## 2019-09-13 ENCOUNTER — LAB ENCOUNTER (OUTPATIENT)
Dept: LAB | Age: 44
End: 2019-09-13
Attending: PHYSICIAN ASSISTANT
Payer: MEDICAID

## 2019-09-13 ENCOUNTER — OFFICE VISIT (OUTPATIENT)
Dept: INTERNAL MEDICINE CLINIC | Facility: CLINIC | Age: 44
End: 2019-09-13
Payer: MEDICAID

## 2019-09-13 VITALS
DIASTOLIC BLOOD PRESSURE: 92 MMHG | SYSTOLIC BLOOD PRESSURE: 142 MMHG | RESPIRATION RATE: 16 BRPM | TEMPERATURE: 98 F | WEIGHT: 315 LBS | OXYGEN SATURATION: 98 % | HEIGHT: 74 IN | BODY MASS INDEX: 40.43 KG/M2 | HEART RATE: 80 BPM

## 2019-09-13 DIAGNOSIS — Z79.4 UNCONTROLLED TYPE 2 DIABETES MELLITUS WITH HYPERGLYCEMIA, WITH LONG-TERM CURRENT USE OF INSULIN (HCC): ICD-10-CM

## 2019-09-13 DIAGNOSIS — E66.01 MORBID OBESITY WITH BMI OF 40.0-44.9, ADULT (HCC): ICD-10-CM

## 2019-09-13 DIAGNOSIS — I10 BENIGN ESSENTIAL HYPERTENSION: ICD-10-CM

## 2019-09-13 DIAGNOSIS — E11.65 UNCONTROLLED TYPE 2 DIABETES MELLITUS WITH HYPERGLYCEMIA, WITH LONG-TERM CURRENT USE OF INSULIN (HCC): ICD-10-CM

## 2019-09-13 DIAGNOSIS — E78.5 HYPERLIPIDEMIA ASSOCIATED WITH TYPE 2 DIABETES MELLITUS (HCC): ICD-10-CM

## 2019-09-13 DIAGNOSIS — E11.69 HYPERLIPIDEMIA ASSOCIATED WITH TYPE 2 DIABETES MELLITUS (HCC): ICD-10-CM

## 2019-09-13 DIAGNOSIS — Z01.818 PREOP EXAMINATION: Primary | ICD-10-CM

## 2019-09-13 DIAGNOSIS — E66.01 SEVERE OBESITY (BMI 35.0-39.9) WITH COMORBIDITY (HCC): ICD-10-CM

## 2019-09-13 DIAGNOSIS — Z01.818 PREOP EXAMINATION: ICD-10-CM

## 2019-09-13 LAB
ALBUMIN SERPL-MCNC: 3.9 G/DL (ref 3.4–5)
ALBUMIN/GLOB SERPL: 1.1 {RATIO} (ref 1–2)
ALP LIVER SERPL-CCNC: 56 U/L (ref 45–117)
ALT SERPL-CCNC: 23 U/L (ref 16–61)
ANION GAP SERPL CALC-SCNC: 4 MMOL/L (ref 0–18)
APTT PPP: 25.1 SECONDS (ref 25.4–36.1)
AST SERPL-CCNC: 15 U/L (ref 15–37)
BASOPHILS # BLD AUTO: 0.06 X10(3) UL (ref 0–0.2)
BASOPHILS NFR BLD AUTO: 1.1 %
BILIRUB SERPL-MCNC: 0.5 MG/DL (ref 0.1–2)
BILIRUB UR QL STRIP.AUTO: NEGATIVE
BUN BLD-MCNC: 16 MG/DL (ref 7–18)
BUN/CREAT SERPL: 13.1 (ref 10–20)
CALCIUM BLD-MCNC: 8.8 MG/DL (ref 8.5–10.1)
CARTRIDGE LOT#: ABNORMAL NUMERIC
CHLORIDE SERPL-SCNC: 107 MMOL/L (ref 98–112)
CHOLEST SMN-MCNC: 149 MG/DL (ref ?–200)
CLARITY UR REFRACT.AUTO: CLEAR
CO2 SERPL-SCNC: 28 MMOL/L (ref 21–32)
COLOR UR AUTO: YELLOW
CREAT BLD-MCNC: 1.22 MG/DL (ref 0.7–1.3)
CREAT UR-SCNC: 398 MG/DL
DEPRECATED RDW RBC AUTO: 42.2 FL (ref 35.1–46.3)
EOSINOPHIL # BLD AUTO: 0.11 X10(3) UL (ref 0–0.7)
EOSINOPHIL NFR BLD AUTO: 2 %
ERYTHROCYTE [DISTWIDTH] IN BLOOD BY AUTOMATED COUNT: 12.2 % (ref 11–15)
GLOBULIN PLAS-MCNC: 3.7 G/DL (ref 2.8–4.4)
GLUCOSE BLD-MCNC: 133 MG/DL (ref 70–99)
GLUCOSE UR STRIP.AUTO-MCNC: NEGATIVE MG/DL
HCT VFR BLD AUTO: 49.8 % (ref 39–53)
HDLC SERPL-MCNC: 42 MG/DL (ref 40–59)
HGB BLD-MCNC: 15.4 G/DL (ref 13–17.5)
IMM GRANULOCYTES # BLD AUTO: 0.02 X10(3) UL (ref 0–1)
IMM GRANULOCYTES NFR BLD: 0.4 %
INR BLD: 1.22 (ref 0.9–1.1)
LDLC SERPL CALC-MCNC: 97 MG/DL (ref ?–100)
LEUKOCYTE ESTERASE UR QL STRIP.AUTO: NEGATIVE
LYMPHOCYTES # BLD AUTO: 2.04 X10(3) UL (ref 1–4)
LYMPHOCYTES NFR BLD AUTO: 37.4 %
M PROTEIN MFR SERPL ELPH: 7.6 G/DL (ref 6.4–8.2)
MCH RBC QN AUTO: 29.1 PG (ref 26–34)
MCHC RBC AUTO-ENTMCNC: 30.9 G/DL (ref 31–37)
MCV RBC AUTO: 94 FL (ref 80–100)
MICROALBUMIN UR-MCNC: 19.9 MG/DL
MICROALBUMIN/CREAT 24H UR-RTO: 50 UG/MG (ref ?–30)
MONOCYTES # BLD AUTO: 0.43 X10(3) UL (ref 0.1–1)
MONOCYTES NFR BLD AUTO: 7.9 %
NEUTROPHILS # BLD AUTO: 2.79 X10 (3) UL (ref 1.5–7.7)
NEUTROPHILS # BLD AUTO: 2.79 X10(3) UL (ref 1.5–7.7)
NEUTROPHILS NFR BLD AUTO: 51.2 %
NITRITE UR QL STRIP.AUTO: NEGATIVE
NONHDLC SERPL-MCNC: 107 MG/DL (ref ?–130)
OSMOLALITY SERPL CALC.SUM OF ELEC: 291 MOSM/KG (ref 275–295)
PH UR STRIP.AUTO: 5 [PH] (ref 4.5–8)
PLATELET # BLD AUTO: 207 10(3)UL (ref 150–450)
POTASSIUM SERPL-SCNC: 4.1 MMOL/L (ref 3.5–5.1)
PROT UR STRIP.AUTO-MCNC: 100 MG/DL
PSA SERPL DL<=0.01 NG/ML-MCNC: 16 SECONDS (ref 12.5–14.7)
RBC # BLD AUTO: 5.3 X10(6)UL (ref 4.3–5.7)
RBC UR QL AUTO: NEGATIVE
SODIUM SERPL-SCNC: 139 MMOL/L (ref 136–145)
SP GR UR STRIP.AUTO: 1.03 (ref 1–1.03)
TRIGL SERPL-MCNC: 49 MG/DL (ref 30–149)
UROBILINOGEN UR STRIP.AUTO-MCNC: 2 MG/DL
VLDLC SERPL CALC-MCNC: 10 MG/DL (ref 0–30)
WBC # BLD AUTO: 5.5 X10(3) UL (ref 4–11)

## 2019-09-13 PROCEDURE — 80053 COMPREHEN METABOLIC PANEL: CPT

## 2019-09-13 PROCEDURE — 81001 URINALYSIS AUTO W/SCOPE: CPT

## 2019-09-13 PROCEDURE — 99244 OFF/OP CNSLTJ NEW/EST MOD 40: CPT | Performed by: PHYSICIAN ASSISTANT

## 2019-09-13 PROCEDURE — 80061 LIPID PANEL: CPT

## 2019-09-13 PROCEDURE — 82043 UR ALBUMIN QUANTITATIVE: CPT

## 2019-09-13 PROCEDURE — 36415 COLL VENOUS BLD VENIPUNCTURE: CPT

## 2019-09-13 PROCEDURE — 82570 ASSAY OF URINE CREATININE: CPT

## 2019-09-13 PROCEDURE — 85025 COMPLETE CBC W/AUTO DIFF WBC: CPT

## 2019-09-13 PROCEDURE — 85730 THROMBOPLASTIN TIME PARTIAL: CPT

## 2019-09-13 PROCEDURE — 83036 HEMOGLOBIN GLYCOSYLATED A1C: CPT | Performed by: PHYSICIAN ASSISTANT

## 2019-09-13 PROCEDURE — 85610 PROTHROMBIN TIME: CPT

## 2019-09-13 RX ORDER — LISINOPRIL 10 MG/1
10 TABLET ORAL DAILY
Qty: 90 TABLET | Refills: 1 | Status: SHIPPED | OUTPATIENT
Start: 2019-09-13 | End: 2019-12-12

## 2019-09-13 NOTE — PATIENT INSTRUCTIONS
High blood pressure:  - start lisinopril 10 mg daily    High cholesterol:  - start atorvastatin 20 mg nightly    Diabetes:  - continue metformin 1,000 mg twice a day with meals  - continue Basaglar 24 units nightly  - start Trulicitiy 1.53 mg once a WEEK

## 2019-09-13 NOTE — PROGRESS NOTES
Clement Murillo is a 45year old male who presents for a Preop physical exam.   HPI:   Pt presents for preop H&P.   Request for medical clearance is made by Dr. Rajni Fiore for perioperative risk assessment due to patient's multiple medical issues as outlined b Smoking status: Never Smoker      Smokeless tobacco: Never Used    Alcohol use: Yes      Frequency: 2-4 times a month      Drinks per session: 1 or 2      Binge frequency: Never      Comment: Social     Drug use: No        REVIEW OF SYSTEMS:   GENERAL: den copy of this H&P as well as preoperative testing (labs, imaging, ekg) will be faxed to the referring provider's office.   # Type 2 DM: uncontrolled but improving.  A1c dropped from 14 (5/2019) to 9.6 in office today. Parvin Cervantes 24 units nightly, metform

## 2019-09-13 NOTE — PROGRESS NOTES
Mary Bain is a 45year old male.      HPI:   Patient presents for pre-op eval:    Upcoming Procedure: right knee scope w/ excision of mass, not yet scheduled    Age: 45year old    Exercise Capacity: 4+ METs = climb flight of stairs, walk up a hill, wa Smokeless tobacco: Never Used    Alcohol use: Yes      Comment: Social     Drug use: No      Family History   Problem Relation Age of Onset   • Cancer Father    • Hypertension Mother    • Other (Healthy) Sister    • Other (Healthy) Brother    • Other (Heal Assessment -- labs requested by surgeon have been placed. --patient is presently at increased risk of pre/intra/post-op complications due to high BP, uncontrolled DM-2, and body habitus.  No known personal or fam rxns to anesthesia  --patient has known ALL

## 2019-09-14 RX ORDER — ATORVASTATIN CALCIUM 20 MG/1
20 TABLET, FILM COATED ORAL NIGHTLY
Qty: 90 TABLET | Refills: 1 | Status: SHIPPED | OUTPATIENT
Start: 2019-09-14 | End: 2020-01-16

## 2019-09-16 ENCOUNTER — TELEPHONE (OUTPATIENT)
Dept: INTERNAL MEDICINE CLINIC | Facility: CLINIC | Age: 44
End: 2019-09-16

## 2019-09-16 NOTE — TELEPHONE ENCOUNTER
[facility] calling asking if we can dispense 4 pens of TRULICITY instead of 3 pens because 4 pens come in 1 box. OK to dispense 4 pens per Haydee Devine PA-C. [address] aware.        Called [employer] #[phone]), spoke t

## 2019-09-18 NOTE — TELEPHONE ENCOUNTER
Faxed prior authorization form to [employer] ([employer] #[phone]). Located in [facility]'s green folder on [employer]. Awaiting PA response.

## 2019-09-19 NOTE — TELEPHONE ENCOUNTER
Reviewed denial letter. Per my records, pt IS currently on metformin (though maybe this hasn't been filled in awhile?). Per [employer] records must show \"paid claim for these drugs within the past 90 days\". Please clarify that he's been taking metfomin.   Ca

## 2019-09-23 NOTE — TELEPHONE ENCOUNTER
Notified patient on [employer] message below. Patient states has been taking Metformin on a daily basis. States it's been a while since his last refill due to having enough quantity at home.   Patient would like a 90-day supply sent to [facility]

## 2019-09-24 NOTE — TELEPHONE ENCOUNTER
Pt notified that RX was sent to pharmacy and that we will work on resubmitting PA. PA will need to be resubmitted.

## 2019-11-06 NOTE — TELEPHONE ENCOUNTER
Trulicity failed protocol due to  Diabetic Medication Protocol Enwtkd77/6 9:24 AM   Last HgBA1C < 7.5   Last OV relevant to medication: 9-13-19  Last refill date: 9-13-19 #/refills: 0  When pt was asked to return for OV: 3-4 mo.    Upcoming appt/reason: non

## 2019-11-07 NOTE — TELEPHONE ENCOUNTER
rx sent to pharmacy but pt needs to f/u with Vahid [place] in the next month for DM mgmt and further refills.

## 2019-12-16 RX ORDER — DULAGLUTIDE 0.75 MG/.5ML
INJECTION, SOLUTION SUBCUTANEOUS
Qty: 4 PEN | Refills: 0 | Status: SHIPPED | OUTPATIENT
Start: 2019-12-16 | End: 2020-01-16

## 2019-12-16 NOTE — TELEPHONE ENCOUNTER
NEVILLE 9.73 SW/5.6XF Subcutaneous Solution Pen-injector    Last OV relevant to medication: 9/13/2019  Last refill date: 11/7/2019     #/refills: #4 pens w/ 0 refills   When pt was asked to return for OV: 4 weeks  Upcoming appt/reason: no future appointm

## 2019-12-26 NOTE — TELEPHONE ENCOUNTER
lmtcb # 1    When patient calls back please inform Due for OV -- please schedule an AM appt and ask pt to fast so we can do blood work same day.

## 2019-12-27 NOTE — TELEPHONE ENCOUNTER
Patient informed and scheduled appt.     Your Appointments    Tuesday January 07, 2020 12:30 PM CST  Physical - Established with [facility]  [address] [facility] [place] [facility] [address] [place] [address]

## 2020-01-16 ENCOUNTER — OFFICE VISIT (OUTPATIENT)
Dept: OCCUPATIONAL MEDICINE | Age: 45
End: 2020-01-16
Attending: FAMILY MEDICINE

## 2020-01-16 ENCOUNTER — TELEPHONE (OUTPATIENT)
Dept: INTERNAL MEDICINE CLINIC | Facility: CLINIC | Age: 45
End: 2020-01-16

## 2020-01-16 ENCOUNTER — OFFICE VISIT (OUTPATIENT)
Dept: INTERNAL MEDICINE CLINIC | Facility: CLINIC | Age: 45
End: 2020-01-16
Payer: MEDICAID

## 2020-01-16 VITALS
SYSTOLIC BLOOD PRESSURE: 151 MMHG | WEIGHT: 315 LBS | HEART RATE: 84 BPM | OXYGEN SATURATION: 98 % | BODY MASS INDEX: 40.43 KG/M2 | DIASTOLIC BLOOD PRESSURE: 95 MMHG | HEIGHT: 74 IN | TEMPERATURE: 98 F | RESPIRATION RATE: 16 BRPM

## 2020-01-16 DIAGNOSIS — E66.01 MORBID OBESITY WITH BMI OF 40.0-44.9, ADULT (HCC): ICD-10-CM

## 2020-01-16 DIAGNOSIS — E11.65 UNCONTROLLED TYPE 2 DIABETES MELLITUS WITH HYPERGLYCEMIA, WITH LONG-TERM CURRENT USE OF INSULIN (HCC): Primary | ICD-10-CM

## 2020-01-16 DIAGNOSIS — E11.69 HYPERLIPIDEMIA ASSOCIATED WITH TYPE 2 DIABETES MELLITUS (HCC): ICD-10-CM

## 2020-01-16 DIAGNOSIS — R31.29 MICROSCOPIC HEMATURIA: ICD-10-CM

## 2020-01-16 DIAGNOSIS — Z79.4 UNCONTROLLED TYPE 2 DIABETES MELLITUS WITH HYPERGLYCEMIA, WITH LONG-TERM CURRENT USE OF INSULIN (HCC): Primary | ICD-10-CM

## 2020-01-16 DIAGNOSIS — I10 BENIGN ESSENTIAL HYPERTENSION: ICD-10-CM

## 2020-01-16 DIAGNOSIS — E78.5 HYPERLIPIDEMIA ASSOCIATED WITH TYPE 2 DIABETES MELLITUS (HCC): ICD-10-CM

## 2020-01-16 LAB — CARTRIDGE LOT#: ABNORMAL NUMERIC

## 2020-01-16 PROCEDURE — 99214 OFFICE O/P EST MOD 30 MIN: CPT | Performed by: PHYSICIAN ASSISTANT

## 2020-01-16 PROCEDURE — 83036 HEMOGLOBIN GLYCOSYLATED A1C: CPT | Performed by: PHYSICIAN ASSISTANT

## 2020-01-16 RX ORDER — LISINOPRIL 10 MG/1
10 TABLET ORAL DAILY
Qty: 30 TABLET | Refills: 0 | Status: SHIPPED | OUTPATIENT
Start: 2020-01-16 | End: 2020-11-03

## 2020-01-16 RX ORDER — ATORVASTATIN CALCIUM 20 MG/1
20 TABLET, FILM COATED ORAL NIGHTLY
Qty: 90 TABLET | Refills: 1 | Status: SHIPPED | OUTPATIENT
Start: 2020-01-16 | End: 2020-11-03

## 2020-01-16 NOTE — PATIENT INSTRUCTIONS
Please do your fasting blood work ASAP.     Diabetes:  - INCREASE metformin: take  mg tablets with breakfast and  mg tablet with dinner  - CONTINUE Trulicity once a week  - START Steglatro 5 mg - 1 tablet each morning    High Blood Pressure:

## 2020-01-16 NOTE — TELEPHONE ENCOUNTER
Received PA message through [employer] for [employer]. Printed form and placed in your in basket for question completion. Please return to [place] to be faxed to insurance.    Ty.

## 2020-01-30 NOTE — TELEPHONE ENCOUNTER
Spoke with patient and verbalized to him that he should now be taking Jardiance (rx sent by Lanette Mackey to pharmacy) in place of Yasmin Amrik.     Explained to patient that he is to take 1 tablet per day     Patient verbalized understanding     Called Pharmacy and

## 2020-01-30 NOTE — TELEPHONE ENCOUNTER
Discussed DM drug coverage with Marquis Bustillo. Jardiance should be preferred. May require step edit at pharmacy (pt doesn't tolerate increased dose of metformin). Rx for Jardiance sent to pharmacy.   Please notify patient I'd like him to take 1 tab daily (

## 2020-04-27 RX ORDER — DULAGLUTIDE 0.75 MG/.5ML
INJECTION, SOLUTION SUBCUTANEOUS
Qty: 4 ML | Refills: 0 | OUTPATIENT
Start: 2020-04-27

## 2020-04-27 NOTE — TELEPHONE ENCOUNTER
Due for 3 month DM visit. Please schedule phone visit and make sure pt is checking blood sugars at home to report at visit.

## 2020-04-27 NOTE — TELEPHONE ENCOUNTER
Trulicity 7.00 last filled 1/16/20 #4 with 1 refill     Last labs   Ref Range & Units 1/16/20  2:27 PM   HEMOGLOBIN A1C  4.3 - 5.6 % 8.5 %Abnormal       LOV 1/16/20   The patient is asked to return here in 3 months for DM

## 2020-04-27 NOTE — TELEPHONE ENCOUNTER
Left message for patient to call back and schedule an appt per Crystal's notes \"due for 3 month DM visit, schedule phone visit and make sure patient is checking blood sugars at home to report at visit\"

## 2020-04-30 ENCOUNTER — VIRTUAL PHONE E/M (OUTPATIENT)
Dept: INTERNAL MEDICINE CLINIC | Facility: CLINIC | Age: 45
End: 2020-04-30
Payer: MEDICAID

## 2020-04-30 DIAGNOSIS — E11.69 HYPERLIPIDEMIA ASSOCIATED WITH TYPE 2 DIABETES MELLITUS (HCC): ICD-10-CM

## 2020-04-30 DIAGNOSIS — E11.65 UNCONTROLLED TYPE 2 DIABETES MELLITUS WITH HYPERGLYCEMIA, WITH LONG-TERM CURRENT USE OF INSULIN (HCC): Primary | ICD-10-CM

## 2020-04-30 DIAGNOSIS — I10 BENIGN ESSENTIAL HYPERTENSION: ICD-10-CM

## 2020-04-30 DIAGNOSIS — E78.5 HYPERLIPIDEMIA ASSOCIATED WITH TYPE 2 DIABETES MELLITUS (HCC): ICD-10-CM

## 2020-04-30 DIAGNOSIS — Z79.4 UNCONTROLLED TYPE 2 DIABETES MELLITUS WITH HYPERGLYCEMIA, WITH LONG-TERM CURRENT USE OF INSULIN (HCC): Primary | ICD-10-CM

## 2020-04-30 PROCEDURE — 99213 OFFICE O/P EST LOW 20 MIN: CPT | Performed by: PHYSICIAN ASSISTANT

## 2020-04-30 NOTE — PROGRESS NOTES
Virtual Telephone Check-In     Suha Siegel verbally consents to a Virtual/Telephone Check-In visit on 4/30/20. Patient understands and accepts financial responsibility for any deductible, co-insurance and/or co-pays associated with this service. Throat  Shellfish-Derived P*        Past Medical History:   Diagnosis Date   • Diabetes ([facility]    • Essential hypertension    • Hyperlipidemia       Past Surgical History:   Procedure Laterality Date   • APPENDECTOMY     • ELBOW SURGERY Right age eval for RBCs.   # Morbid obesity, BMI 41: counseled on lifestyle changes.  Has seen bariatrics, considering surgery this year or next.     Optometry - [facility], [place]    The patient indicates understanding of these issues and agrees to the plan

## 2020-09-17 NOTE — TELEPHONE ENCOUNTER
Refill requested:   Requested Prescriptions     Pending Prescriptions Disp Refills   • TRULICITY 6.54 RQ/9.7AG Subcutaneous Solution Pen-injector [Pharmacy Med Name: Trulicity 1.41 RJ/6.6GN Subcutaneous Solution Pen-injector] 12 mL 0     Sig: INJECT 1 SYRI

## 2020-09-18 RX ORDER — DULAGLUTIDE 0.75 MG/.5ML
INJECTION, SOLUTION SUBCUTANEOUS
Qty: 12 ML | Refills: 0 | Status: SHIPPED | OUTPATIENT
Start: 2020-09-18 | End: 2020-11-03

## 2020-10-29 ENCOUNTER — OCC HEALTH (OUTPATIENT)
Dept: OCCUPATIONAL MEDICINE | Age: 45
End: 2020-10-29
Attending: PREVENTIVE MEDICINE

## 2020-11-03 ENCOUNTER — TELEPHONE (OUTPATIENT)
Dept: INTERNAL MEDICINE CLINIC | Facility: CLINIC | Age: 45
End: 2020-11-03

## 2020-11-03 ENCOUNTER — OFFICE VISIT (OUTPATIENT)
Dept: INTERNAL MEDICINE CLINIC | Facility: CLINIC | Age: 45
End: 2020-11-03
Payer: MEDICAID

## 2020-11-03 VITALS
RESPIRATION RATE: 20 BRPM | TEMPERATURE: 99 F | HEIGHT: 74 IN | WEIGHT: 315 LBS | SYSTOLIC BLOOD PRESSURE: 152 MMHG | DIASTOLIC BLOOD PRESSURE: 84 MMHG | OXYGEN SATURATION: 98 % | BODY MASS INDEX: 40.43 KG/M2 | HEART RATE: 88 BPM

## 2020-11-03 DIAGNOSIS — R06.83 SNORING: ICD-10-CM

## 2020-11-03 DIAGNOSIS — I10 BENIGN ESSENTIAL HYPERTENSION: ICD-10-CM

## 2020-11-03 DIAGNOSIS — Z79.4 UNCONTROLLED TYPE 2 DIABETES MELLITUS WITH HYPERGLYCEMIA, WITH LONG-TERM CURRENT USE OF INSULIN (HCC): ICD-10-CM

## 2020-11-03 DIAGNOSIS — Z00.00 ANNUAL PHYSICAL EXAM: Primary | ICD-10-CM

## 2020-11-03 DIAGNOSIS — E78.5 HYPERLIPIDEMIA ASSOCIATED WITH TYPE 2 DIABETES MELLITUS (HCC): ICD-10-CM

## 2020-11-03 DIAGNOSIS — E11.69 HYPERLIPIDEMIA ASSOCIATED WITH TYPE 2 DIABETES MELLITUS (HCC): ICD-10-CM

## 2020-11-03 DIAGNOSIS — E11.65 UNCONTROLLED TYPE 2 DIABETES MELLITUS WITH HYPERGLYCEMIA, WITH LONG-TERM CURRENT USE OF INSULIN (HCC): ICD-10-CM

## 2020-11-03 DIAGNOSIS — E66.01 MORBID OBESITY WITH BMI OF 40.0-44.9, ADULT (HCC): ICD-10-CM

## 2020-11-03 PROCEDURE — 83036 HEMOGLOBIN GLYCOSYLATED A1C: CPT | Performed by: PHYSICIAN ASSISTANT

## 2020-11-03 PROCEDURE — 99396 PREV VISIT EST AGE 40-64: CPT | Performed by: PHYSICIAN ASSISTANT

## 2020-11-03 PROCEDURE — 3008F BODY MASS INDEX DOCD: CPT | Performed by: PHYSICIAN ASSISTANT

## 2020-11-03 PROCEDURE — 3077F SYST BP >= 140 MM HG: CPT | Performed by: PHYSICIAN ASSISTANT

## 2020-11-03 PROCEDURE — 99214 OFFICE O/P EST MOD 30 MIN: CPT | Performed by: PHYSICIAN ASSISTANT

## 2020-11-03 PROCEDURE — 3079F DIAST BP 80-89 MM HG: CPT | Performed by: PHYSICIAN ASSISTANT

## 2020-11-03 RX ORDER — ATORVASTATIN CALCIUM 20 MG/1
20 TABLET, FILM COATED ORAL NIGHTLY
Qty: 90 TABLET | Refills: 1 | Status: SHIPPED | OUTPATIENT
Start: 2020-11-03 | End: 2021-06-12

## 2020-11-03 RX ORDER — LISINOPRIL 10 MG/1
10 TABLET ORAL DAILY
Qty: 30 TABLET | Refills: 0 | Status: SHIPPED | OUTPATIENT
Start: 2020-11-03 | End: 2021-01-07

## 2020-11-03 RX ORDER — EMPAGLIFLOZIN 10 MG/1
10 TABLET, FILM COATED ORAL DAILY
Qty: 90 TABLET | Refills: 1 | Status: SHIPPED | OUTPATIENT
Start: 2020-11-03 | End: 2021-05-20

## 2020-11-03 RX ORDER — DULAGLUTIDE 1.5 MG/.5ML
1.5 INJECTION, SOLUTION SUBCUTANEOUS WEEKLY
Qty: 4 PEN | Refills: 0 | Status: SHIPPED | OUTPATIENT
Start: 2020-11-03 | End: 2020-12-03

## 2020-11-03 NOTE — PROGRESS NOTES
Albaro Mariee is a 39year old male who presents for a complete physical exam.   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  Did not pass d/t elevated BP (155/92, 151/88). Has been off lisinopril for several months.   Previously tolerating well, no (Healthy) Brother    • Other (Healthy) Brother       Social History:  Social History    Tobacco Use      Smoking status: Never Smoker      Smokeless tobacco: Never Used    Alcohol use: Yes      Frequency: 2-4 times a month      Drinks per session: 1 or 2 updated. Rec yearly skin exam, vision exam, and dental check ups q 6 months. # Uncontrolled type 2 DM: A1c 9.3 today. Increase Trulicity to 1.5 mg weekly, resume metformin (500 mg BID - doesn't tolerate increased dose d/t GI upset), and Jardiance 10 mg.

## 2020-11-03 NOTE — TELEPHONE ENCOUNTER
OV notes from 11/3/20    # Uncontrolled type 2 DM: A1c 9.3 today. Increase Trulicity to 1.5 mg weekly, resume metformin (500 mg BID - doesn't tolerate increased dose d/t GI upset), and Jardiance 10 mg.  Asked to check FBS & 2 hr PP BS.       Please clarify

## 2020-11-03 NOTE — PATIENT INSTRUCTIONS
Diabetes:  - INCREASE Trulicity to 1.5 mg once a week  - RESUME metformin 500 mg - 1 tablet twice a day with meals  - RESUME Jardiance 10 mg - 1 tablet each morning  - Please focus on a diet consisting of lean or plant based proteins, fruits, veggies, and

## 2020-11-07 ENCOUNTER — LAB ENCOUNTER (OUTPATIENT)
Dept: LAB | Age: 45
End: 2020-11-07
Attending: PHYSICIAN ASSISTANT
Payer: MEDICAID

## 2020-11-07 DIAGNOSIS — Z00.00 ANNUAL PHYSICAL EXAM: ICD-10-CM

## 2020-11-07 DIAGNOSIS — E11.65 UNCONTROLLED TYPE 2 DIABETES MELLITUS WITH HYPERGLYCEMIA, WITH LONG-TERM CURRENT USE OF INSULIN (HCC): ICD-10-CM

## 2020-11-07 DIAGNOSIS — Z79.4 UNCONTROLLED TYPE 2 DIABETES MELLITUS WITH HYPERGLYCEMIA, WITH LONG-TERM CURRENT USE OF INSULIN (HCC): ICD-10-CM

## 2020-11-07 PROCEDURE — 83036 HEMOGLOBIN GLYCOSYLATED A1C: CPT

## 2020-11-07 PROCEDURE — 36415 COLL VENOUS BLD VENIPUNCTURE: CPT

## 2020-11-07 PROCEDURE — 86803 HEPATITIS C AB TEST: CPT

## 2020-12-01 NOTE — TELEPHONE ENCOUNTER
Medication(s) to Refill:   Requested Prescriptions     Pending Prescriptions Disp Refills   • TRULICITY 1.5 AF/3.7KL Subcutaneous Solution Pen-injector [Pharmacy Med Name: Trulicity 1.5 [place]/7.8UG Subcutaneous Solution Pen-injector] 4 mL 0     Sig: INJECT 1

## 2020-12-03 RX ORDER — DULAGLUTIDE 1.5 MG/.5ML
INJECTION, SOLUTION SUBCUTANEOUS
Qty: 4 ML | Refills: 0 | Status: SHIPPED | OUTPATIENT
Start: 2020-12-03 | End: 2021-01-07

## 2020-12-09 NOTE — TELEPHONE ENCOUNTER
Appointment scheduled    Future Appointments   Date Time Provider Fito Woodall   12/16/2020  9:00 AM [facility] [place]

## 2020-12-10 ENCOUNTER — TELEPHONE (OUTPATIENT)
Dept: INTERNAL MEDICINE CLINIC | Facility: CLINIC | Age: 45
End: 2020-12-10

## 2020-12-10 NOTE — TELEPHONE ENCOUNTER
Received paperwork regarding approval for home sleep study (in my outbox). Please have patient arrange this through sleep MD (rec Dr. Rick Chiu or Dr. Monica Merchant if he needs to stay within [facility]).

## 2020-12-11 NOTE — TELEPHONE ENCOUNTER
Patient provided with LL instructions listed below and verbalized understanding. Pt provided with contact info for Dr HUGHES [facility].

## 2020-12-12 ENCOUNTER — OFFICE VISIT (OUTPATIENT)
Dept: SLEEP CENTER | Age: 45
End: 2020-12-12
Attending: PHYSICIAN ASSISTANT
Payer: MEDICAID

## 2020-12-12 DIAGNOSIS — I10 BENIGN ESSENTIAL HYPERTENSION: ICD-10-CM

## 2020-12-12 DIAGNOSIS — E66.01 MORBID OBESITY WITH BMI OF 40.0-44.9, ADULT (HCC): ICD-10-CM

## 2020-12-12 DIAGNOSIS — R06.83 SNORING: ICD-10-CM

## 2020-12-12 PROCEDURE — 95806 SLEEP STUDY UNATT&RESP EFFT: CPT

## 2020-12-30 NOTE — PROCEDURES
[address] [address],[facility]       Accredited by the [facility] ([facility])    PATIENT'S NAME:        [place] PHYSICIAN:   Kathy Bejarano M.D.   REFERRING PHYSICIAN:     PATIENT ACCOUNT #:     1 patient should avoid alcohol or sedatives prior to sleep.       Dictated By Lorie Owusu M.D.  d: 12/29/2020 15:21:38  t: 12/29/2020 15:38:53  [facility] [id number]  RN/    cc: BIBIANA Coffey M.D.

## 2021-01-07 RX ORDER — LISINOPRIL 10 MG/1
TABLET ORAL
Qty: 30 TABLET | Refills: 0 | Status: SHIPPED | OUTPATIENT
Start: 2021-01-07 | End: 2021-06-12

## 2021-01-07 RX ORDER — DULAGLUTIDE 1.5 MG/.5ML
INJECTION, SOLUTION SUBCUTANEOUS
Qty: 4 ML | Refills: 0 | Status: SHIPPED | OUTPATIENT
Start: 2021-01-07 | End: 2021-02-22

## 2021-01-07 NOTE — TELEPHONE ENCOUNTER
Name from pharmacy: Trulicity 1.5 EG/3.2NG Subcutaneous Solution Pen-injector          Will file in chart as: TRULICITY 1.5 YR/9.3MO Subcutaneous Solution Pen-injector    Sig: INJECT 1 SYRINGE SUBCUTANEOUSLY ONCE A WEEK    Disp:  4 mL    Refills:  0     [place]

## 2021-01-07 NOTE — TELEPHONE ENCOUNTER
One month of meds refilled. Pt needs to schedule appt with Vahid May for f/u of DM AND needs to complete fasting blood work ASAP -- labs ordered one year ago to eval cholesterol, kidney/liver function, electrolytes have not been done.   Order expires nex

## 2021-02-20 NOTE — TELEPHONE ENCOUNTER
Scheduled DM follow up with pt for 3/3/2021     Protocol failed   Medication(s) to Refill:   Requested Prescriptions     Pending Prescriptions Disp Refills   • TRULICITY 1.5 GR/0.4GA Subcutaneous Solution Pen-injector [Pharmacy Med Name: Trulicity 1.5 MG/0

## 2021-02-22 RX ORDER — DULAGLUTIDE 1.5 MG/.5ML
INJECTION, SOLUTION SUBCUTANEOUS
Qty: 4 ML | Refills: 0 | Status: SHIPPED | OUTPATIENT
Start: 2021-02-22 | End: 2021-05-26

## 2021-05-03 ENCOUNTER — MED REC SCAN ONLY (OUTPATIENT)
Dept: INTERNAL MEDICINE CLINIC | Facility: CLINIC | Age: 46
End: 2021-05-03

## 2021-05-03 RX ORDER — LISINOPRIL 10 MG/1
TABLET ORAL
Qty: 30 TABLET | Refills: 0 | OUTPATIENT
Start: 2021-05-03

## 2021-05-03 RX ORDER — DULAGLUTIDE 1.5 MG/.5ML
INJECTION, SOLUTION SUBCUTANEOUS
Qty: 4 ML | Refills: 0 | OUTPATIENT
Start: 2021-05-03

## 2021-05-04 ENCOUNTER — TELEPHONE (OUTPATIENT)
Dept: INTERNAL MEDICINE CLINIC | Facility: CLINIC | Age: 46
End: 2021-05-04

## 2021-05-04 DIAGNOSIS — Z79.4 UNCONTROLLED TYPE 2 DIABETES MELLITUS WITH HYPERGLYCEMIA, WITH LONG-TERM CURRENT USE OF INSULIN (HCC): Primary | ICD-10-CM

## 2021-05-04 DIAGNOSIS — I10 BENIGN ESSENTIAL HYPERTENSION: ICD-10-CM

## 2021-05-04 DIAGNOSIS — E11.65 UNCONTROLLED TYPE 2 DIABETES MELLITUS WITH HYPERGLYCEMIA, WITH LONG-TERM CURRENT USE OF INSULIN (HCC): Primary | ICD-10-CM

## 2021-05-04 DIAGNOSIS — E78.5 HYPERLIPIDEMIA ASSOCIATED WITH TYPE 2 DIABETES MELLITUS (HCC): ICD-10-CM

## 2021-05-04 DIAGNOSIS — E11.69 HYPERLIPIDEMIA ASSOCIATED WITH TYPE 2 DIABETES MELLITUS (HCC): ICD-10-CM

## 2021-05-04 RX ORDER — LISINOPRIL 10 MG/1
10 TABLET ORAL DAILY
Qty: 30 TABLET | Refills: 0 | OUTPATIENT
Start: 2021-05-04

## 2021-05-04 RX ORDER — DULAGLUTIDE 1.5 MG/.5ML
INJECTION, SOLUTION SUBCUTANEOUS
Qty: 4 ML | Refills: 0 | OUTPATIENT
Start: 2021-05-04

## 2021-05-04 NOTE — TELEPHONE ENCOUNTER
lvm for pt to cb and make a med refill fup. Pt was made aware that appt is need it for any further refills.

## 2021-05-04 NOTE — TELEPHONE ENCOUNTER
Patient stated he has a second job and it is very hard for him to get in to work with his schedule. Patient is coming in for a med refill and prefers Saturdays.  Patient scheduled for Crystal[facility] next available Saturday but is unsure if he can still get medic

## 2021-05-04 NOTE — TELEPHONE ENCOUNTER
Pt needs to complete fasting blood work (includes urine test) prior to any further refills. If he does his labs this week (lab is open on Saturdays) then I will send in refill prior to his appointment in June.

## 2021-05-19 ENCOUNTER — LAB ENCOUNTER (OUTPATIENT)
Dept: LAB | Age: 46
End: 2021-05-19
Attending: PHYSICIAN ASSISTANT
Payer: MEDICAID

## 2021-05-19 DIAGNOSIS — E11.69 HYPERLIPIDEMIA ASSOCIATED WITH TYPE 2 DIABETES MELLITUS (HCC): ICD-10-CM

## 2021-05-19 DIAGNOSIS — E78.5 HYPERLIPIDEMIA ASSOCIATED WITH TYPE 2 DIABETES MELLITUS (HCC): ICD-10-CM

## 2021-05-19 DIAGNOSIS — Z79.4 UNCONTROLLED TYPE 2 DIABETES MELLITUS WITH HYPERGLYCEMIA, WITH LONG-TERM CURRENT USE OF INSULIN (HCC): ICD-10-CM

## 2021-05-19 DIAGNOSIS — I10 BENIGN ESSENTIAL HYPERTENSION: ICD-10-CM

## 2021-05-19 DIAGNOSIS — E11.65 UNCONTROLLED TYPE 2 DIABETES MELLITUS WITH HYPERGLYCEMIA, WITH LONG-TERM CURRENT USE OF INSULIN (HCC): ICD-10-CM

## 2021-05-19 PROCEDURE — 84460 ALANINE AMINO (ALT) (SGPT): CPT

## 2021-05-19 PROCEDURE — 80048 BASIC METABOLIC PNL TOTAL CA: CPT

## 2021-05-19 PROCEDURE — 82043 UR ALBUMIN QUANTITATIVE: CPT

## 2021-05-19 PROCEDURE — 83036 HEMOGLOBIN GLYCOSYLATED A1C: CPT

## 2021-05-19 PROCEDURE — 82570 ASSAY OF URINE CREATININE: CPT

## 2021-05-19 PROCEDURE — 80061 LIPID PANEL: CPT

## 2021-05-19 PROCEDURE — 36415 COLL VENOUS BLD VENIPUNCTURE: CPT

## 2021-05-19 PROCEDURE — 84450 TRANSFERASE (AST) (SGOT): CPT

## 2021-05-20 DIAGNOSIS — Z79.4 UNCONTROLLED TYPE 2 DIABETES MELLITUS WITH HYPERGLYCEMIA, WITH LONG-TERM CURRENT USE OF INSULIN (HCC): Primary | ICD-10-CM

## 2021-05-20 DIAGNOSIS — E11.65 UNCONTROLLED TYPE 2 DIABETES MELLITUS WITH HYPERGLYCEMIA, WITH LONG-TERM CURRENT USE OF INSULIN (HCC): Primary | ICD-10-CM

## 2021-05-20 RX ORDER — EMPAGLIFLOZIN 25 MG/1
1 TABLET, FILM COATED ORAL DAILY
Qty: 90 TABLET | Refills: 1 | Status: SHIPPED | OUTPATIENT
Start: 2021-05-20

## 2021-05-24 NOTE — TELEPHONE ENCOUNTER
Medication(s) to Refill:   Requested Prescriptions     Pending Prescriptions Disp Refills   • TRULICITY 1.5 EL/1.7SN Subcutaneous Solution Pen-injector [Pharmacy Med Name: Trulicity 1.5 XG/1.5EL Subcutaneous Solution Pen-injector] 4 mL 0     Sig: INJECT 1

## 2021-05-26 RX ORDER — DULAGLUTIDE 1.5 MG/.5ML
INJECTION, SOLUTION SUBCUTANEOUS
Qty: 4 ML | Refills: 0 | Status: SHIPPED | OUTPATIENT
Start: 2021-05-26 | End: 2021-08-19

## 2021-06-12 ENCOUNTER — OFFICE VISIT (OUTPATIENT)
Dept: INTERNAL MEDICINE CLINIC | Facility: CLINIC | Age: 46
End: 2021-06-12
Payer: MEDICAID

## 2021-06-12 VITALS
HEIGHT: 74 IN | TEMPERATURE: 99 F | WEIGHT: 314 LBS | DIASTOLIC BLOOD PRESSURE: 80 MMHG | OXYGEN SATURATION: 97 % | SYSTOLIC BLOOD PRESSURE: 134 MMHG | BODY MASS INDEX: 40.3 KG/M2 | HEART RATE: 80 BPM | RESPIRATION RATE: 16 BRPM

## 2021-06-12 DIAGNOSIS — E11.65 UNCONTROLLED TYPE 2 DIABETES MELLITUS WITH HYPERGLYCEMIA, WITH LONG-TERM CURRENT USE OF INSULIN (HCC): Primary | ICD-10-CM

## 2021-06-12 DIAGNOSIS — E66.01 MORBID OBESITY WITH BMI OF 40.0-44.9, ADULT (HCC): ICD-10-CM

## 2021-06-12 DIAGNOSIS — Z79.4 UNCONTROLLED TYPE 2 DIABETES MELLITUS WITH HYPERGLYCEMIA, WITH LONG-TERM CURRENT USE OF INSULIN (HCC): Primary | ICD-10-CM

## 2021-06-12 DIAGNOSIS — I10 BENIGN ESSENTIAL HYPERTENSION: ICD-10-CM

## 2021-06-12 DIAGNOSIS — G47.33 OSA (OBSTRUCTIVE SLEEP APNEA): ICD-10-CM

## 2021-06-12 DIAGNOSIS — E11.69 HYPERLIPIDEMIA ASSOCIATED WITH TYPE 2 DIABETES MELLITUS (HCC): ICD-10-CM

## 2021-06-12 DIAGNOSIS — E78.5 HYPERLIPIDEMIA ASSOCIATED WITH TYPE 2 DIABETES MELLITUS (HCC): ICD-10-CM

## 2021-06-12 PROCEDURE — 99214 OFFICE O/P EST MOD 30 MIN: CPT | Performed by: PHYSICIAN ASSISTANT

## 2021-06-12 PROCEDURE — 3075F SYST BP GE 130 - 139MM HG: CPT | Performed by: PHYSICIAN ASSISTANT

## 2021-06-12 PROCEDURE — 3008F BODY MASS INDEX DOCD: CPT | Performed by: PHYSICIAN ASSISTANT

## 2021-06-12 PROCEDURE — 3079F DIAST BP 80-89 MM HG: CPT | Performed by: PHYSICIAN ASSISTANT

## 2021-06-12 RX ORDER — ATORVASTATIN CALCIUM 20 MG/1
20 TABLET, FILM COATED ORAL NIGHTLY
Qty: 90 TABLET | Refills: 1 | Status: SHIPPED | OUTPATIENT
Start: 2021-06-12

## 2021-06-12 RX ORDER — LISINOPRIL 20 MG/1
20 TABLET ORAL DAILY
Qty: 90 TABLET | Refills: 1 | Status: SHIPPED | OUTPATIENT
Start: 2021-06-12

## 2021-06-12 NOTE — PROGRESS NOTES
Clement Murillo is a 39year old male. HPI:   Patient presents for f/u med visit. Labs done 5/19/21 sig for the following:  , a1c 9.8  LDL 94  Creat 1.36, GFR 73  AST/ALT wnl  Urine malb/cre <30    DM - compliant with meds, no SEs.   AM FBS in th Problem Relation Age of Onset   • Cancer Father    • Hypertension Mother    • Other (Healthy) Sister    • Other (Healthy) Brother    • Other (Healthy) Brother         REVIEW OF SYSTEMS:   GENERAL HEALTH: denies fever, chills, night sweats  SKIN: denies a HLP: off statin for the past month, will resume. # Morbid obesity, BMI 40: counseled on lifestyle changes. # SHARIF: sleep study done 12/2020. Poor compliance with CPAP. Discussed risks of untreated SHARIF.   Rec f/u with sleep med.     Health Maint (CPX on 1

## 2021-06-12 NOTE — PATIENT INSTRUCTIONS
Please see Yessenia Boston ASAP for diabetes. Follow up with Dr. Magdaleno Santos for sleep apnea.     Blood pressure:  - increase lisinopril to 20 mg and take at night instead of morning    Please use [facility] or call [facility] Luis at [phone]

## 2021-06-17 ENCOUNTER — TELEPHONE (OUTPATIENT)
Dept: INTERNAL MEDICINE CLINIC | Facility: CLINIC | Age: 46
End: 2021-06-17

## 2021-06-17 NOTE — TELEPHONE ENCOUNTER
Left voicemail with patient, need dialated eye exam. Dr. Sangeeta Haider preformed eye exam but no dilation was completed.     Copy of eye exam placed into scanning;

## 2021-07-07 NOTE — PROGRESS NOTES
Group Topic: BH Process Group     Date: 7/7/2021  Start Time:  9:15 AM  End Time: 10:00 AM  Facilitators: Nikkie Lemons LCSW    Focus: Check-in; discussed symptoms and goal for the day.   Number in attendance: 13      Method: Group  Attendance: Present  Participation: Active  Patient Response: Appropriate feedback and Attentive  Mood: Anxious and Depressed  Affect: Type: Anxious and Depressed   Range: Full (normal)   Congruency: Congruent   Stability: Stable  Behavior/Socialization: Appropriate to group and Cooperative  Thought Process: Racing thoughts  Task Performance: Follows directions  Patient Evaluation: Independent - full participation   Appearance/Hygiene:  Appropriate  Appetite: No appetite  Sleep: Sleeping well  Sensory Motor: Normal   Medication:Compliant   Suicidal ideation: Denies suicidal ideation, plan, or intent.   Homicidal ideation: Denies homicidal ideation, plan, or intent.  Self Injury: patient denies    This visit was performed via live interactive two-way video.    Clinician Location: [facility]    Patient Location: Home  Patient verbally consented to video visit.          Neli Koch is a 45year old male. HPI:   Patient presents for f/u of chronic issues. DM - Trulicity once a week, metformin once a day instead of BID d/t nausea, stopped Basaglar. AM FBS in the [address]   HTN , HLP - did not start lisinopril or st distress  SKIN: no rashes, no suspicious lesions  HEENT: normocephalic, atraumatic, conjunctiva clear, EOMI, OP clear, MMM, TMs clear and flat, turbinates normal  NECK: supple, no thyromegaly, no lymphadenopathy  LUNGS: regular breathing rate and effort, c

## 2021-08-19 RX ORDER — DULAGLUTIDE 1.5 MG/.5ML
INJECTION, SOLUTION SUBCUTANEOUS
Qty: 4 ML | Refills: 1 | Status: SHIPPED | OUTPATIENT
Start: 2021-08-19

## 2021-08-19 NOTE — TELEPHONE ENCOUNTER
LOV: 6/12/2021 with Gianfranco Backbone, PAMATA  RTC: 3-6 months  Last Relevant Labs: 5/19/2021   Filled: 5/26/2021   #4 mL with 0 refills    No future appointments.

## 2023-05-02 ENCOUNTER — LAB ENCOUNTER (OUTPATIENT)
Dept: LAB | Age: 48
End: 2023-05-02
Attending: PHYSICIAN ASSISTANT
Payer: COMMERCIAL

## 2023-05-02 ENCOUNTER — OFFICE VISIT (OUTPATIENT)
Dept: INTERNAL MEDICINE CLINIC | Facility: CLINIC | Age: 48
End: 2023-05-02
Payer: COMMERCIAL

## 2023-05-02 VITALS
WEIGHT: 315 LBS | BODY MASS INDEX: 40.43 KG/M2 | SYSTOLIC BLOOD PRESSURE: 132 MMHG | TEMPERATURE: 98 F | RESPIRATION RATE: 16 BRPM | HEART RATE: 82 BPM | DIASTOLIC BLOOD PRESSURE: 84 MMHG | HEIGHT: 74 IN

## 2023-05-02 DIAGNOSIS — E11.65 UNCONTROLLED TYPE 2 DIABETES MELLITUS WITH HYPERGLYCEMIA (HCC): ICD-10-CM

## 2023-05-02 DIAGNOSIS — E66.01 MORBID OBESITY WITH BMI OF 40.0-44.9, ADULT (HCC): ICD-10-CM

## 2023-05-02 DIAGNOSIS — Z00.00 ANNUAL PHYSICAL EXAM: Primary | ICD-10-CM

## 2023-05-02 DIAGNOSIS — I15.2 HYPERTENSION ASSOCIATED WITH TYPE 2 DIABETES MELLITUS (HCC): ICD-10-CM

## 2023-05-02 DIAGNOSIS — E11.69 HYPERLIPIDEMIA ASSOCIATED WITH TYPE 2 DIABETES MELLITUS (HCC): ICD-10-CM

## 2023-05-02 DIAGNOSIS — E78.5 HYPERLIPIDEMIA ASSOCIATED WITH TYPE 2 DIABETES MELLITUS (HCC): ICD-10-CM

## 2023-05-02 DIAGNOSIS — E11.59 HYPERTENSION ASSOCIATED WITH TYPE 2 DIABETES MELLITUS (HCC): ICD-10-CM

## 2023-05-02 DIAGNOSIS — Z00.00 ANNUAL PHYSICAL EXAM: ICD-10-CM

## 2023-05-02 DIAGNOSIS — Z12.11 COLON CANCER SCREENING: ICD-10-CM

## 2023-05-02 DIAGNOSIS — M25.561 CHRONIC PAIN OF RIGHT KNEE: ICD-10-CM

## 2023-05-02 DIAGNOSIS — G47.33 OSA (OBSTRUCTIVE SLEEP APNEA): ICD-10-CM

## 2023-05-02 DIAGNOSIS — G89.29 CHRONIC PAIN OF RIGHT KNEE: ICD-10-CM

## 2023-05-02 PROBLEM — I10 BENIGN ESSENTIAL HYPERTENSION: Status: RESOLVED | Noted: 2018-04-09 | Resolved: 2023-05-02

## 2023-05-02 LAB
ALT SERPL-CCNC: 27 U/L
ANION GAP SERPL CALC-SCNC: 7 MMOL/L (ref 0–18)
AST SERPL-CCNC: 19 U/L (ref 15–37)
BUN BLD-MCNC: 18 MG/DL (ref 7–18)
CALCIUM BLD-MCNC: 9 MG/DL (ref 8.5–10.1)
CHLORIDE SERPL-SCNC: 106 MMOL/L (ref 98–112)
CHOLEST SERPL-MCNC: 143 MG/DL (ref ?–200)
CO2 SERPL-SCNC: 24 MMOL/L (ref 21–32)
COMPLEXED PSA SERPL-MCNC: 0.6 NG/ML (ref ?–4)
CREAT BLD-MCNC: 1.26 MG/DL
CREAT UR-SCNC: 209 MG/DL
EST. AVERAGE GLUCOSE BLD GHB EST-MCNC: 283 MG/DL (ref 68–126)
FASTING PATIENT LIPID ANSWER: YES
FASTING STATUS PATIENT QL REPORTED: YES
GFR SERPLBLD BASED ON 1.73 SQ M-ARVRAT: 72 ML/MIN/1.73M2 (ref 60–?)
GLUCOSE BLD-MCNC: 165 MG/DL (ref 70–99)
HBA1C MFR BLD: 11.5 % (ref ?–5.7)
HDLC SERPL-MCNC: 51 MG/DL (ref 40–59)
LDLC SERPL CALC-MCNC: 82 MG/DL (ref ?–100)
MICROALBUMIN UR-MCNC: 1 MG/DL
MICROALBUMIN/CREAT 24H UR-RTO: 4.8 UG/MG (ref ?–30)
NONHDLC SERPL-MCNC: 92 MG/DL (ref ?–130)
OSMOLALITY SERPL CALC.SUM OF ELEC: 290 MOSM/KG (ref 275–295)
POTASSIUM SERPL-SCNC: 3.8 MMOL/L (ref 3.5–5.1)
SODIUM SERPL-SCNC: 137 MMOL/L (ref 136–145)
TRIGL SERPL-MCNC: 45 MG/DL (ref 30–149)
TSI SER-ACNC: 1 MIU/ML (ref 0.36–3.74)
VLDLC SERPL CALC-MCNC: 7 MG/DL (ref 0–30)

## 2023-05-02 PROCEDURE — 3046F HEMOGLOBIN A1C LEVEL >9.0%: CPT | Performed by: INTERNAL MEDICINE

## 2023-05-02 PROCEDURE — 36415 COLL VENOUS BLD VENIPUNCTURE: CPT

## 2023-05-02 PROCEDURE — 80061 LIPID PANEL: CPT

## 2023-05-02 PROCEDURE — 99214 OFFICE O/P EST MOD 30 MIN: CPT | Performed by: PHYSICIAN ASSISTANT

## 2023-05-02 PROCEDURE — 3075F SYST BP GE 130 - 139MM HG: CPT | Performed by: PHYSICIAN ASSISTANT

## 2023-05-02 PROCEDURE — 90677 PCV20 VACCINE IM: CPT | Performed by: PHYSICIAN ASSISTANT

## 2023-05-02 PROCEDURE — 84460 ALANINE AMINO (ALT) (SGPT): CPT

## 2023-05-02 PROCEDURE — 90471 IMMUNIZATION ADMIN: CPT | Performed by: PHYSICIAN ASSISTANT

## 2023-05-02 PROCEDURE — 99396 PREV VISIT EST AGE 40-64: CPT | Performed by: PHYSICIAN ASSISTANT

## 2023-05-02 PROCEDURE — 83036 HEMOGLOBIN GLYCOSYLATED A1C: CPT

## 2023-05-02 PROCEDURE — 90715 TDAP VACCINE 7 YRS/> IM: CPT | Performed by: PHYSICIAN ASSISTANT

## 2023-05-02 PROCEDURE — 3061F NEG MICROALBUMINURIA REV: CPT | Performed by: INTERNAL MEDICINE

## 2023-05-02 PROCEDURE — 3079F DIAST BP 80-89 MM HG: CPT | Performed by: PHYSICIAN ASSISTANT

## 2023-05-02 PROCEDURE — 84450 TRANSFERASE (AST) (SGOT): CPT

## 2023-05-02 PROCEDURE — 3008F BODY MASS INDEX DOCD: CPT | Performed by: PHYSICIAN ASSISTANT

## 2023-05-02 PROCEDURE — 90472 IMMUNIZATION ADMIN EACH ADD: CPT | Performed by: PHYSICIAN ASSISTANT

## 2023-05-02 PROCEDURE — 84443 ASSAY THYROID STIM HORMONE: CPT

## 2023-05-02 PROCEDURE — 82043 UR ALBUMIN QUANTITATIVE: CPT

## 2023-05-02 PROCEDURE — 80048 BASIC METABOLIC PNL TOTAL CA: CPT

## 2023-05-02 PROCEDURE — 82570 ASSAY OF URINE CREATININE: CPT

## 2023-05-02 RX ORDER — LISINOPRIL 10 MG/1
10 TABLET ORAL DAILY
Qty: 90 TABLET | Refills: 1 | Status: SHIPPED | OUTPATIENT
Start: 2023-05-02

## 2023-05-02 NOTE — PATIENT INSTRUCTIONS
Blood work today. Start lisinopril 10 mg - 1 tablet nightly for blood pressure. See Dr. Poli Seay for colonoscopy. Diabetic eye exam ASAP. See Gaurav Olivier or Steve Will for diabetes management. See Dr. Kassidy Bragg or one of his partners for knee pain.

## 2023-05-18 NOTE — TELEPHONE ENCOUNTER
Patient called stating he tried to get his prescription refilled but was told he had to contact his Dr.  Per Shirley's Note, patient had to schedule an appointment. Scheduled appointment for Tuesday, 5/23/23 @ 1:30 pm...

## 2023-05-18 NOTE — TELEPHONE ENCOUNTER
LOV: 5/2/2023 with Yudy Wilburn PA-C  RTC: \"in 2 weeks for HTN\"  Last Relevant Labs: 5/2/2023  Filled: 11/3/2020    #180 with 1 refill    Future Appointments   Date Time Provider Fito Woodall   5/23/2023  1:30 PM GOOD Schafer [facility] [place]

## 2023-06-03 ENCOUNTER — OFFICE VISIT (OUTPATIENT)
Dept: INTERNAL MEDICINE CLINIC | Facility: CLINIC | Age: 48
End: 2023-06-03
Payer: COMMERCIAL

## 2023-06-03 VITALS
RESPIRATION RATE: 16 BRPM | DIASTOLIC BLOOD PRESSURE: 70 MMHG | HEART RATE: 84 BPM | BODY MASS INDEX: 39.91 KG/M2 | WEIGHT: 311 LBS | TEMPERATURE: 98 F | HEIGHT: 74 IN | SYSTOLIC BLOOD PRESSURE: 130 MMHG | OXYGEN SATURATION: 99 %

## 2023-06-03 DIAGNOSIS — E11.65 UNCONTROLLED TYPE 2 DIABETES MELLITUS WITH HYPERGLYCEMIA (HCC): ICD-10-CM

## 2023-06-03 DIAGNOSIS — I15.2 HYPERTENSION ASSOCIATED WITH TYPE 2 DIABETES MELLITUS (HCC): Primary | ICD-10-CM

## 2023-06-03 DIAGNOSIS — E66.01 MORBID OBESITY WITH BMI OF 40.0-44.9, ADULT (HCC): ICD-10-CM

## 2023-06-03 DIAGNOSIS — E78.5 HYPERLIPIDEMIA ASSOCIATED WITH TYPE 2 DIABETES MELLITUS (HCC): ICD-10-CM

## 2023-06-03 DIAGNOSIS — M25.532 LEFT WRIST PAIN: ICD-10-CM

## 2023-06-03 DIAGNOSIS — E11.69 HYPERLIPIDEMIA ASSOCIATED WITH TYPE 2 DIABETES MELLITUS (HCC): ICD-10-CM

## 2023-06-03 DIAGNOSIS — E11.59 HYPERTENSION ASSOCIATED WITH TYPE 2 DIABETES MELLITUS (HCC): Primary | ICD-10-CM

## 2023-06-03 PROCEDURE — 99214 OFFICE O/P EST MOD 30 MIN: CPT | Performed by: INTERNAL MEDICINE

## 2023-06-03 PROCEDURE — 3075F SYST BP GE 130 - 139MM HG: CPT | Performed by: INTERNAL MEDICINE

## 2023-06-03 PROCEDURE — 3008F BODY MASS INDEX DOCD: CPT | Performed by: INTERNAL MEDICINE

## 2023-06-03 PROCEDURE — 3078F DIAST BP <80 MM HG: CPT | Performed by: INTERNAL MEDICINE

## 2023-06-03 RX ORDER — TIRZEPATIDE 5 MG/.5ML
5 INJECTION, SOLUTION SUBCUTANEOUS WEEKLY
Qty: 6 ML | Refills: 0 | Status: SHIPPED | OUTPATIENT
Start: 2023-06-03

## 2023-06-03 RX ORDER — BLOOD-GLUCOSE SENSOR
1 EACH MISCELLANEOUS CONTINUOUS
Qty: 6 EACH | Refills: 0 | Status: SHIPPED | OUTPATIENT
Start: 2023-06-03

## 2023-06-03 RX ORDER — MELOXICAM 7.5 MG/1
7.5 TABLET ORAL DAILY PRN
Qty: 30 TABLET | Refills: 3 | Status: SHIPPED | OUTPATIENT
Start: 2023-06-03

## 2023-06-03 NOTE — PATIENT INSTRUCTIONS
- We will start you on a new diabetes medication, Mounjaro. Similar to the Trulicity. Inject once weekly. - I sent in a prescription for the [facility] continuous glucose monitors - we will see if insurance covers it. If not we will get the name of your glucose meter and send in supply prescriptions. - Get eye exam done at [facility] in the next few months  - Follow up with [facility]  - Follow up with us in 3 months. It was a pleasure seeing you in the clinic today. Thank you for choosing the [facility] office for your healthcare needs. Please call at [phone] with any questions or concerns.     Nikita Lowery MD

## 2023-06-12 PROBLEM — Z12.11 SPECIAL SCREENING FOR MALIGNANT NEOPLASM OF COLON: Status: ACTIVE | Noted: 2023-06-12

## 2023-06-19 ENCOUNTER — TELEPHONE (OUTPATIENT)
Dept: INTERNAL MEDICINE CLINIC | Facility: CLINIC | Age: 48
End: 2023-06-19

## 2023-09-05 DIAGNOSIS — E78.5 HYPERLIPIDEMIA ASSOCIATED WITH TYPE 2 DIABETES MELLITUS: ICD-10-CM

## 2023-09-05 DIAGNOSIS — E11.65 UNCONTROLLED TYPE 2 DIABETES MELLITUS WITH HYPERGLYCEMIA (HCC): ICD-10-CM

## 2023-09-05 DIAGNOSIS — E11.69 HYPERLIPIDEMIA ASSOCIATED WITH TYPE 2 DIABETES MELLITUS: ICD-10-CM

## 2023-09-05 DIAGNOSIS — E66.01 MORBID OBESITY WITH BMI OF 40.0-44.9, ADULT (HCC): ICD-10-CM

## 2023-09-05 DIAGNOSIS — I15.2 HYPERTENSION ASSOCIATED WITH TYPE 2 DIABETES MELLITUS: ICD-10-CM

## 2023-09-05 DIAGNOSIS — E11.59 HYPERTENSION ASSOCIATED WITH TYPE 2 DIABETES MELLITUS: ICD-10-CM

## 2023-09-06 RX ORDER — TIRZEPATIDE 5 MG/.5ML
5 INJECTION, SOLUTION SUBCUTANEOUS WEEKLY
Qty: 6 ML | Refills: 1 | Status: SHIPPED | OUTPATIENT
Start: 2023-09-06

## 2023-09-06 NOTE — TELEPHONE ENCOUNTER
PROTOCOL FAILED   Name from pharmacy: Mounjaro 5 MG/0.5ML Subcutaneous Solution Pen-injector         Will file in chart as: MOUNJARO 5 MG/0.5ML Subcutaneous Solution Pen-injector    Sig: INJECT 5MG INTO THE SKIN ONCE A WEEK    Disp: 12 mL    Refills: 0    Start: 9/5/2023    Class: Normal    Non-formulary For: Uncontrolled type 2 diabetes mellitus with hyperglycemia ([facility] [address] Hypertension associated with type 2 diabetes mellitus ; Hyperlipidemia associated with type 2 diabetes mellitus ;  Morbid obesity with BMI of 40.0-44.9, adult [facility])    Last ordered: 3 months ago by Jocy Bourgeois MD Last refill: 6/23/2023    Rx #: [id number]    Diabetic Medication Protocol [id number]/05/2023 03:51 PM   Protocol Details Last HgBA1C < 7.5    HgBA1C procedure resulted in past 6 months    Microalbumin procedure in past 12 months or taking ACE/ARB    Appointment in past 6 or next 3 months      To be filled at: [address] [facility], [address], [address]: 06/03/23 w/ Dr. Johnson Diss  RTC: 09/03/23  RECENT LABS: 05/02/23   FOV: 09/11/23

## 2024-02-20 DIAGNOSIS — I15.2 HYPERTENSION ASSOCIATED WITH TYPE 2 DIABETES MELLITUS (HCC): ICD-10-CM

## 2024-02-20 DIAGNOSIS — E11.59 HYPERTENSION ASSOCIATED WITH TYPE 2 DIABETES MELLITUS (HCC): ICD-10-CM

## 2024-02-20 DIAGNOSIS — E66.01 MORBID OBESITY WITH BMI OF 40.0-44.9, ADULT (HCC): ICD-10-CM

## 2024-02-20 DIAGNOSIS — E11.65 UNCONTROLLED TYPE 2 DIABETES MELLITUS WITH HYPERGLYCEMIA (HCC): ICD-10-CM

## 2024-02-20 DIAGNOSIS — E11.69 HYPERLIPIDEMIA ASSOCIATED WITH TYPE 2 DIABETES MELLITUS (HCC): ICD-10-CM

## 2024-02-20 DIAGNOSIS — E78.5 HYPERLIPIDEMIA ASSOCIATED WITH TYPE 2 DIABETES MELLITUS (HCC): ICD-10-CM

## 2024-02-20 RX ORDER — TIRZEPATIDE 5 MG/.5ML
5 INJECTION, SOLUTION SUBCUTANEOUS WEEKLY
Qty: 6 ML | Refills: 0 | Status: SHIPPED | OUTPATIENT
Start: 2024-02-20

## 2024-02-20 NOTE — TELEPHONE ENCOUNTER
LOV: 6/3/2023 with Dr. Min  RTC: 3 months  Last Relevant Labs: 5/2/2023  Filled: 9/6/2023    #6 mL with 1 refill    No future appointments.

## 2024-03-07 ENCOUNTER — OFFICE VISIT (OUTPATIENT)
Dept: INTERNAL MEDICINE CLINIC | Facility: CLINIC | Age: 49
End: 2024-03-07
Payer: COMMERCIAL

## 2024-03-07 ENCOUNTER — NURSE ONLY (OUTPATIENT)
Dept: INTERNAL MEDICINE CLINIC | Facility: CLINIC | Age: 49
End: 2024-03-07
Payer: COMMERCIAL

## 2024-03-07 VITALS
WEIGHT: 312.63 LBS | HEART RATE: 88 BPM | RESPIRATION RATE: 18 BRPM | TEMPERATURE: 99 F | OXYGEN SATURATION: 97 % | DIASTOLIC BLOOD PRESSURE: 86 MMHG | SYSTOLIC BLOOD PRESSURE: 136 MMHG | BODY MASS INDEX: 40.12 KG/M2 | HEIGHT: 74 IN

## 2024-03-07 DIAGNOSIS — I15.2 HYPERTENSION ASSOCIATED WITH TYPE 2 DIABETES MELLITUS (HCC): ICD-10-CM

## 2024-03-07 DIAGNOSIS — E11.65 UNCONTROLLED TYPE 2 DIABETES MELLITUS WITH HYPERGLYCEMIA (HCC): ICD-10-CM

## 2024-03-07 DIAGNOSIS — Z12.5 SCREENING FOR MALIGNANT NEOPLASM OF PROSTATE: ICD-10-CM

## 2024-03-07 DIAGNOSIS — E11.69 HYPERLIPIDEMIA ASSOCIATED WITH TYPE 2 DIABETES MELLITUS (HCC): ICD-10-CM

## 2024-03-07 DIAGNOSIS — E11.65 UNCONTROLLED TYPE 2 DIABETES MELLITUS WITH HYPERGLYCEMIA (HCC): Primary | ICD-10-CM

## 2024-03-07 DIAGNOSIS — E11.59 HYPERTENSION ASSOCIATED WITH TYPE 2 DIABETES MELLITUS (HCC): ICD-10-CM

## 2024-03-07 DIAGNOSIS — E78.5 HYPERLIPIDEMIA ASSOCIATED WITH TYPE 2 DIABETES MELLITUS (HCC): ICD-10-CM

## 2024-03-07 DIAGNOSIS — E66.01 MORBID OBESITY WITH BMI OF 40.0-44.9, ADULT (HCC): ICD-10-CM

## 2024-03-07 PROBLEM — G47.33 OSA (OBSTRUCTIVE SLEEP APNEA): Chronic | Status: ACTIVE | Noted: 2021-06-12

## 2024-03-07 PROCEDURE — 92229 IMG RTA DETC/MNTR DS POC ALY: CPT | Performed by: INTERNAL MEDICINE

## 2024-03-07 PROCEDURE — 3075F SYST BP GE 130 - 139MM HG: CPT | Performed by: INTERNAL MEDICINE

## 2024-03-07 PROCEDURE — 99214 OFFICE O/P EST MOD 30 MIN: CPT | Performed by: INTERNAL MEDICINE

## 2024-03-07 PROCEDURE — 3079F DIAST BP 80-89 MM HG: CPT | Performed by: INTERNAL MEDICINE

## 2024-03-07 PROCEDURE — 2026F EYE IMG VALID EVC RTNOPTHY: CPT | Performed by: INTERNAL MEDICINE

## 2024-03-07 PROCEDURE — 3008F BODY MASS INDEX DOCD: CPT | Performed by: INTERNAL MEDICINE

## 2024-03-07 RX ORDER — LISINOPRIL 10 MG/1
10 TABLET ORAL DAILY
Qty: 90 TABLET | Refills: 1 | Status: SHIPPED | OUTPATIENT
Start: 2024-03-07

## 2024-03-07 NOTE — PROGRESS NOTES
Juli Santamaria is a 48 year old male.   HPI:   Patient presents for follow up on chronic medical issues.    Hypertension - elevated reading.  Ran out of lisinopril.   Hyperlipidemia - lifestyle controlled.  DM II - tolerating Mounjaro.  Taking 5 mg weekly.  Admits that he does not always adhere to diabetic diet.  Morbid obesity - Body mass index is 40.14 kg/m².    Acute issues:  Some irritation/dryness on skin on shaft of penis.  No rash, no discharge/drainage.  Doing much better after using lotion/cream.    Past medical, family, surgical and social history were reviewed as listed in the chart, and are unchanged from previous visit.  REVIEW OF SYSTEMS:   GENERAL/ const: no fevers/chills, no unintentional weight loss  SKIN: as per HPI  EYES:no vision problems  HEENT: denies sinus pain or sinus tenderness  LUNGS: denies shortness of breath   CARDIOVASCULAR: denies chest pain  GI: denies nausea/emesis/ abdominal pain diarrhea constipation  : denies dysuria   MUSCULOSKELETAL: occasional ankle pain  NEURO: denies headaches  PSYCHIATRIC: denies issues  ENDOCRINE: no hot/cold intolerance  ALLERGY:   Allergies   Allergen Reactions    Iodides (Topical) TONGUE SWELLING and Tightness in Throat    Shellfish-Derived Products      PAST HISTORY:     Current Outpatient Medications:     lisinopril 10 MG Oral Tab, Take 1 tablet (10 mg total) by mouth daily., Disp: 90 tablet, Rfl: 1    Tirzepatide (MOUNJARO) 5 MG/0.5ML Subcutaneous Solution Pen-injector, Inject 5 mg into the skin once a week. Due for office visit, Disp: 6 mL, Rfl: 0    Meloxicam 7.5 MG Oral Tab, Take 1 tablet (7.5 mg total) by mouth daily as needed (wrist pain)., Disp: 30 tablet, Rfl: 3    Continuous Blood Gluc Sensor (FREESTYLE VIKKI 3 SENSOR) Does not apply Misc, 1 each continuous. Change sensors every 2 weeks (Patient not taking: Reported on 3/7/2024), Disp: 6 each, Rfl: 0  Medical:  has a past medical history of Diabetes (HCC), Essential hypertension,  Hyperlipidemia, and SHARIF (obstructive sleep apnea) (12/29/2020 HST).  Surgical:  has a past surgical history that includes elbow surgery (Right, age 9); appendectomy; knee surgery (Right, 2004); other surgical history (01/2019); and other surgical history (10/2019).  Family: family history includes Cancer in his father; Healthy in his brother, brother, and sister; Hypertension in his mother.  Social:  reports that he has never smoked. He has never used smokeless tobacco. He reports current alcohol use of about 2.0 - 3.0 standard drinks of alcohol per week. He reports that he does not currently use drugs after having used the following drugs: Cannabis. Frequency: 0.50 times per week.  Wt Readings from Last 6 Encounters:   03/07/24 (!) 312 lb 9.6 oz (141.8 kg)   06/07/23 (!) 309 lb (140.2 kg)   06/03/23 (!) 311 lb (141.1 kg)   05/02/23 (!) 316 lb (143.3 kg)   06/12/21 (!) 314 lb (142.4 kg)   02/16/21 (!) 330 lb (149.7 kg)     EXAM:   /86 (BP Location: Left arm, Patient Position: Sitting, Cuff Size: large)   Pulse 88   Temp 98.7 °F (37.1 °C) (Temporal)   Resp 18   Ht 6' 2\" (1.88 m)   Wt (!) 312 lb 9.6 oz (141.8 kg)   SpO2 97%   BMI 40.14 kg/m²   GENERAL: Alert and oriented, well developed, well nourished,in no apparent distress  SKIN: no lesions/rashes noted of genital area  HEENT: atraumatic, PERRLA, EOMI, normal lid and conjunctiva  LUNGS: clear to auscultation bilaterally, no wheezing/rubs  CARDIO: RRR without murmurs.  No clubbing, cyanosis or edema.  GI: soft non tender nondistended no hepatosplenomegaly, bowel sounds throughout  NEURO: CN II-XII intact, 5/5 strength all extremities  MS: Full ROM, no joint pain  PSYCH: pleasant, appropriate mood and affect  ASSESSMENT AND PLAN:   1. Uncontrolled type 2 diabetes mellitus with hyperglycemia (HCC)  A1c of 11.5% in May 2023.  Patient did not follow up in fall as scheduled.  On Mounjaro 5 mg weekly.  Had GI side effects with metformin.  Did not tolerate  Jardiance.  Will check updated labs - if A1c above goal will increase Mounjaro dose and refer to Endocrinology.  Retinal camera exam done in office today - suggestive of diabetic retinopathy with macular edema - discussed results with patient and referred patient to Ophthalmology for full diabetic eye exam.  Microalbumin ordered.  Up to date with foot exam.  Not on statin therapy.  - Diabetic Retinopathy Exam  OU - Both Eyes; Future  - Comp Metabolic Panel (14); Future  - Hemoglobin A1C; Future  - Lipid Panel; Future  - Microalb/Creat Ratio, Random Urine; Future  - Ophthalmology Referral - In Network    2. Hypertension associated with type 2 diabetes mellitus (HCC)  Repeat reading better, but borderline.  Patient ran out of lisinopril.  Will continue/resume lisinopril 10 mg every day.  - Comp Metabolic Panel (14); Future  - lisinopril 10 MG Oral Tab; Take 1 tablet (10 mg total) by mouth daily.  Dispense: 90 tablet; Refill: 1    3. Hyperlipidemia associated with type 2 diabetes mellitus (HCC)  Not on statin therapy.  Will start statin if LDL > 70.    - Lipid Panel; Future    4. Morbid obesity with BMI of 40.0-44.9, adult (HCC)  Body mass index is 40.14 kg/m².  Focus on lifestyle modification.  On Mounjaro as above.    5. Screening for malignant neoplasm of prostate  PSA ordered.  - PSA Total, Screen; Future    Patient Care Team:  Rosalva Min MD as PCP - General (Internal Medicine)  Matt Dick MD as Consulting Physician (SURGERY, ORTHOPEDIC)  The patient indicates understanding of these issues and agrees to the plan.  The patient is asked to return to clinic in 3-6 months for follow up on chronic issues, or earlier if acute issues arise.    Rosalva Min MD

## 2024-03-07 NOTE — PATIENT INSTRUCTIONS
- Resume lisinopril for blood pressure.  Refill sent in  - Get blood and urine tests done when fasting (water and medications only for 8 hours).  Our lab across the [place] is open on Saturdays from 7:30 - 3:30  - If diabetes/blood sugars are high again we will increase your Mounjaro dose and have you follow up with our Endocrinologists:  Dr. Dee and Jeff  [address] [place] [address] [address]  [place] [address]  [phone]    It was a pleasure seeing you in the clinic today.  Thank you for choosing the [facility] office for your healthcare needs. Please call at [phone] with any questions or concerns.    Rosalva Min MD

## 2024-03-13 ENCOUNTER — LAB ENCOUNTER (OUTPATIENT)
Dept: LAB | Age: 49
End: 2024-03-13
Attending: INTERNAL MEDICINE
Payer: COMMERCIAL

## 2024-03-13 DIAGNOSIS — I15.2 HYPERTENSION ASSOCIATED WITH TYPE 2 DIABETES MELLITUS (HCC): ICD-10-CM

## 2024-03-13 DIAGNOSIS — Z12.5 SCREENING FOR MALIGNANT NEOPLASM OF PROSTATE: ICD-10-CM

## 2024-03-13 DIAGNOSIS — E78.5 HYPERLIPIDEMIA ASSOCIATED WITH TYPE 2 DIABETES MELLITUS (HCC): ICD-10-CM

## 2024-03-13 DIAGNOSIS — E11.65 UNCONTROLLED TYPE 2 DIABETES MELLITUS WITH HYPERGLYCEMIA (HCC): ICD-10-CM

## 2024-03-13 DIAGNOSIS — E11.69 HYPERLIPIDEMIA ASSOCIATED WITH TYPE 2 DIABETES MELLITUS (HCC): ICD-10-CM

## 2024-03-13 DIAGNOSIS — E11.59 HYPERTENSION ASSOCIATED WITH TYPE 2 DIABETES MELLITUS (HCC): ICD-10-CM

## 2024-03-13 LAB
ALBUMIN SERPL-MCNC: 3.9 G/DL (ref 3.4–5)
ALBUMIN/GLOB SERPL: 1.1 {RATIO} (ref 1–2)
ALP LIVER SERPL-CCNC: 48 U/L
ALT SERPL-CCNC: 25 U/L
ANION GAP SERPL CALC-SCNC: 5 MMOL/L (ref 0–18)
AST SERPL-CCNC: 20 U/L (ref 15–37)
BILIRUB SERPL-MCNC: 0.6 MG/DL (ref 0.1–2)
BUN BLD-MCNC: 20 MG/DL (ref 9–23)
CALCIUM BLD-MCNC: 9.4 MG/DL (ref 8.5–10.1)
CHLORIDE SERPL-SCNC: 108 MMOL/L (ref 98–112)
CHOLEST SERPL-MCNC: 161 MG/DL (ref ?–200)
CO2 SERPL-SCNC: 25 MMOL/L (ref 21–32)
COMPLEXED PSA SERPL-MCNC: 0.87 NG/ML (ref ?–4)
CREAT BLD-MCNC: 1.44 MG/DL
CREAT UR-SCNC: 293 MG/DL
EGFRCR SERPLBLD CKD-EPI 2021: 61 ML/MIN/1.73M2 (ref 60–?)
EST. AVERAGE GLUCOSE BLD GHB EST-MCNC: 134 MG/DL (ref 68–126)
FASTING PATIENT LIPID ANSWER: YES
FASTING STATUS PATIENT QL REPORTED: YES
GLOBULIN PLAS-MCNC: 3.5 G/DL (ref 2.8–4.4)
GLUCOSE BLD-MCNC: 99 MG/DL (ref 70–99)
HBA1C MFR BLD: 6.3 % (ref ?–5.7)
HDLC SERPL-MCNC: 57 MG/DL (ref 40–59)
LDLC SERPL CALC-MCNC: 96 MG/DL (ref ?–100)
MICROALBUMIN UR-MCNC: 2.12 MG/DL
MICROALBUMIN/CREAT 24H UR-RTO: 7.2 UG/MG (ref ?–30)
NONHDLC SERPL-MCNC: 104 MG/DL (ref ?–130)
OSMOLALITY SERPL CALC.SUM OF ELEC: 289 MOSM/KG (ref 275–295)
POTASSIUM SERPL-SCNC: 4.5 MMOL/L (ref 3.5–5.1)
PROT SERPL-MCNC: 7.4 G/DL (ref 6.4–8.2)
SODIUM SERPL-SCNC: 138 MMOL/L (ref 136–145)
TRIGL SERPL-MCNC: 33 MG/DL (ref 30–149)
VLDLC SERPL CALC-MCNC: 5 MG/DL (ref 0–30)

## 2024-03-13 PROCEDURE — 80053 COMPREHEN METABOLIC PANEL: CPT

## 2024-03-13 PROCEDURE — 82570 ASSAY OF URINE CREATININE: CPT

## 2024-03-13 PROCEDURE — 36415 COLL VENOUS BLD VENIPUNCTURE: CPT

## 2024-03-13 PROCEDURE — 83036 HEMOGLOBIN GLYCOSYLATED A1C: CPT

## 2024-03-13 PROCEDURE — 80061 LIPID PANEL: CPT

## 2024-03-13 PROCEDURE — 82043 UR ALBUMIN QUANTITATIVE: CPT

## 2024-03-14 DIAGNOSIS — E11.65 UNCONTROLLED TYPE 2 DIABETES MELLITUS WITH HYPERGLYCEMIA (HCC): Chronic | ICD-10-CM

## 2024-03-14 DIAGNOSIS — E78.5 HYPERLIPIDEMIA ASSOCIATED WITH TYPE 2 DIABETES MELLITUS (HCC): Primary | Chronic | ICD-10-CM

## 2024-03-14 DIAGNOSIS — E11.69 HYPERLIPIDEMIA ASSOCIATED WITH TYPE 2 DIABETES MELLITUS (HCC): Primary | Chronic | ICD-10-CM

## 2024-03-14 RX ORDER — ATORVASTATIN CALCIUM 10 MG/1
10 TABLET, FILM COATED ORAL NIGHTLY
Qty: 90 TABLET | Refills: 1 | Status: SHIPPED | OUTPATIENT
Start: 2024-03-14

## 2024-04-26 ENCOUNTER — OFFICE VISIT (OUTPATIENT)
Dept: INTERNAL MEDICINE CLINIC | Facility: CLINIC | Age: 49
End: 2024-04-26
Payer: COMMERCIAL

## 2024-04-26 VITALS
OXYGEN SATURATION: 97 % | HEART RATE: 82 BPM | SYSTOLIC BLOOD PRESSURE: 132 MMHG | BODY MASS INDEX: 38.81 KG/M2 | DIASTOLIC BLOOD PRESSURE: 84 MMHG | WEIGHT: 302.38 LBS | RESPIRATION RATE: 18 BRPM | HEIGHT: 74 IN | TEMPERATURE: 98 F

## 2024-04-26 DIAGNOSIS — G89.29 CHRONIC PAIN OF RIGHT KNEE: ICD-10-CM

## 2024-04-26 DIAGNOSIS — J06.9 ACUTE UPPER RESPIRATORY INFECTION: Primary | ICD-10-CM

## 2024-04-26 DIAGNOSIS — M25.561 CHRONIC PAIN OF RIGHT KNEE: ICD-10-CM

## 2024-04-26 DIAGNOSIS — M25.861 CYST OF RIGHT KNEE JOINT: ICD-10-CM

## 2024-04-26 PROCEDURE — 3079F DIAST BP 80-89 MM HG: CPT | Performed by: INTERNAL MEDICINE

## 2024-04-26 PROCEDURE — 3075F SYST BP GE 130 - 139MM HG: CPT | Performed by: INTERNAL MEDICINE

## 2024-04-26 PROCEDURE — 3061F NEG MICROALBUMINURIA REV: CPT | Performed by: INTERNAL MEDICINE

## 2024-04-26 PROCEDURE — 99214 OFFICE O/P EST MOD 30 MIN: CPT | Performed by: INTERNAL MEDICINE

## 2024-04-26 PROCEDURE — 3008F BODY MASS INDEX DOCD: CPT | Performed by: INTERNAL MEDICINE

## 2024-04-26 PROCEDURE — 3044F HG A1C LEVEL LT 7.0%: CPT | Performed by: INTERNAL MEDICINE

## 2024-04-26 RX ORDER — CODEINE PHOSPHATE AND GUAIFENESIN 10; 100 MG/5ML; MG/5ML
5 SOLUTION ORAL NIGHTLY PRN
Qty: 118 ML | Refills: 0 | Status: SHIPPED | OUTPATIENT
Start: 2024-04-26

## 2024-04-26 RX ORDER — FLUTICASONE PROPIONATE 50 MCG
2 SPRAY, SUSPENSION (ML) NASAL DAILY
Qty: 1 EACH | Refills: 0 | Status: SHIPPED | OUTPATIENT
Start: 2024-04-26

## 2024-04-26 NOTE — PATIENT INSTRUCTIONS
- Start nasal spray, fluticasone.  Use twice daily for next week  -  an over the counter allergy medication (cetirizine, loratadine, fexofenadine, or levocetirizine).  Take 1 tablet nightly for 1 week.  - Can use prescription codeine cough syrup if your throat pain comes back, but this medication can make you drowsy so do not drive after taking it.    - For the knee/bump, follow up with our Orthopedics clinic:  Dr. Fox, Dr. Vieira, Dr. Montez and others (ask for first available appointment/provider, ok to see their assistants if they have earlier openings than the surgeons)  [address] [place] [address] [id number] [phone]    - In the meantime you can use the meloxicam anti-inflammatory I prescribed last year for the wrist for your your knee pain (1 tablet daily as needed, take with food).    - Otherwise follow up with me September 5th for diabetes/chronic issues as previously scheduled.    It was a pleasure seeing you in the clinic today.  Thank you for choosing the [facility] office for your healthcare needs. Please call at [phone] with any questions or concerns.    Rosalva Min MD

## 2024-04-26 NOTE — PROGRESS NOTES
Juli Santamaria is a 48 year old male.   HPI:     Chief Complaint   Patient presents with    Pain     knee     Patient presents to discuss several issues.  Started with ear pain 3 days ago - sore throat 2 days ago.  Runny nose, post-nasal drip.  Has been taking Dayquil.  Feeling better.    Separately, he has been dealing with pain, swelling in left medial knee.  Has a bump in left medial knee area.  Bump has fluctuated in size.  No specific trauma to the area.  Has had knee arthroscopy on that knee a few years ago.    Past medical, family, surgical and social history were reviewed as listed in the chart, and are unchanged from previous visit on 3/7/2024  REVIEW OF SYSTEMS:   GENERAL/ const: no fevers/chills, no unintentional weight loss  EYES:no vision problems  HEENT: ear pain, throat pain/congestion  LUNGS: denies shortness of breath   CARDIOVASCULAR: denies chest pain  GI: denies nausea/emesis/ abdominal pain diarrhea constipation  : denies dysuria   MUSCULOSKELETAL: right knee pain, bump/swelling  NEURO: denies headaches  PSYCHIATRIC: denies issues  ENDOCRINE: no hot/cold intolerance  ALLERGY:   Allergies   Allergen Reactions    Iodides (Topical) TONGUE SWELLING and Tightness in Throat    Shellfish-Derived Products      PAST HISTORY:     Current Outpatient Medications:     guaiFENesin-codeine 100-10 MG/5ML Oral Solution, Take 5 mL by mouth nightly as needed for cough or congestion., Disp: 118 mL, Rfl: 0    fluticasone propionate 50 MCG/ACT Nasal Suspension, 2 sprays by Each Nare route daily., Disp: 1 each, Rfl: 0    atorvastatin 10 MG Oral Tab, Take 1 tablet (10 mg total) by mouth nightly., Disp: 90 tablet, Rfl: 1    lisinopril 10 MG Oral Tab, Take 1 tablet (10 mg total) by mouth daily., Disp: 90 tablet, Rfl: 1    Tirzepatide (MOUNJARO) 5 MG/0.5ML Subcutaneous Solution Pen-injector, Inject 5 mg into the skin once a week. Due for office visit, Disp: 6 mL, Rfl: 0    Continuous Blood Gluc Sensor (FREESTYLE VIKKI 3  SENSOR) Does not apply Misc, 1 each continuous. Change sensors every 2 weeks (Patient not taking: Reported on 3/7/2024), Disp: 6 each, Rfl: 0    Meloxicam 7.5 MG Oral Tab, Take 1 tablet (7.5 mg total) by mouth daily as needed (wrist pain). (Patient not taking: Reported on 4/26/2024), Disp: 30 tablet, Rfl: 3  Medical:  has a past medical history of Diabetes (HCC), Essential hypertension, Hyperlipidemia, and SHARIF (obstructive sleep apnea) (12/29/2020 HST).  Surgical:  has a past surgical history that includes elbow surgery (Right, age 9); appendectomy; knee surgery (Right, 2004); other surgical history (01/2019); and other surgical history (10/2019).  Family: family history includes Cancer in his father; Healthy in his brother, brother, and sister; Hypertension in his mother.  Social:  reports that he has never smoked. He has never used smokeless tobacco. He reports current alcohol use of about 2.0 - 3.0 standard drinks of alcohol per week. He reports that he does not currently use drugs after having used the following drugs: Cannabis. Frequency: 0.50 times per week.  Wt Readings from Last 6 Encounters:   04/26/24 (!) 302 lb 6.4 oz (137.2 kg)   03/07/24 (!) 312 lb 9.6 oz (141.8 kg)   06/07/23 (!) 309 lb (140.2 kg)   06/03/23 (!) 311 lb (141.1 kg)   05/02/23 (!) 316 lb (143.3 kg)   06/12/21 (!) 314 lb (142.4 kg)     EXAM:   /84 (BP Location: Right arm, Patient Position: Sitting, Cuff Size: adult)   Pulse 82   Temp 98.1 °F (36.7 °C) (Temporal)   Resp 18   Ht 6' 2\" (1.88 m)   Wt (!) 302 lb 6.4 oz (137.2 kg)   SpO2 97%   BMI 38.83 kg/m²   GENERAL: Alert and oriented, well developed, well nourished,in no apparent distress  SKIN: no rashes,no suspicious lesions  HEENT: atraumatic, PERRLA, EOMI, normal lid and conjunctiva, normal external canals and tympanic membranes bilaterally, normal pharynx, no sinus tenderness  NECK: supple, no jvd, no thyromegaly  LUNGS: clear to auscultation bilaterally, no  wheezing/rubs  CARDIO: RRR without murmurs.  No clubbing, cyanosis or edema.  GI: soft non tender nondistended no hepatosplenomegaly, bowel sounds throughout  NEURO: CN II-XII intact, 5/5 strength all extremities  MS: Full ROM, lump/cyst right medial knee  PSYCH: pleasant, appropriate mood and affect  ASSESSMENT AND PLAN:   1. Acute upper respiratory infection  Patient with URI symptoms this week, first with right ear pain, then sore throat, now mostly nasal drip/upper congestion.  Unremarkable HEENT/respiratory examinations.  Suspect viral illness. Recommend supportive therapy for now.  Fluticasone nasal spray prescribed.  Recommend OTC antihistamine.  Advised patient to start guaifenesin-codeine syrup as needed.  Advised not to drive after taking this medication due to potential drowsiness.   - guaiFENesin-codeine 100-10 MG/5ML Oral Solution; Take 5 mL by mouth nightly as needed for cough or congestion.  Dispense: 118 mL; Refill: 0  - fluticasone propionate 50 MCG/ACT Nasal Suspension; 2 sprays by Each Nare route daily.  Dispense: 1 each; Refill: 0    2. Chronic pain of right knee  3. Cyst of right knee joint  Patient with increasing pain in right knee of late.  Intermittent.  On his feet all day for his job.  Bump/swelling right medial knee.  Of note, he had MRI of this knee in 2017 which was suggestive of right knee ganglion cyst.  Suspect this is the lump/swelling he has now.  Recommend follow up with Orthopedics. In the meantime advised him it is ok for him to take his meloxicam from last year as needed for his knee pain.  - Ortho Referral - In Network    Patient Care Team:  Rosalva Min MD as PCP - General (Internal Medicine)  Matt Dick MD as Consulting Physician (SURGERY, ORTHOPEDIC)  The patient indicates understanding of these issues and agrees to the plan.  The patient is asked to return to clinic in September 2024 as previously scheduled for follow up on chronic issues, or earlier if acute  issues arise.    Rosalva Min MD

## 2024-06-05 ENCOUNTER — OFFICE VISIT (OUTPATIENT)
Dept: OCCUPATIONAL MEDICINE | Age: 49
End: 2024-06-05
Attending: FAMILY MEDICINE

## 2024-06-05 DIAGNOSIS — E66.01 MORBID OBESITY WITH BMI OF 40.0-44.9, ADULT (HCC): ICD-10-CM

## 2024-06-05 DIAGNOSIS — I15.2 HYPERTENSION ASSOCIATED WITH TYPE 2 DIABETES MELLITUS (HCC): ICD-10-CM

## 2024-06-05 DIAGNOSIS — E78.5 HYPERLIPIDEMIA ASSOCIATED WITH TYPE 2 DIABETES MELLITUS (HCC): ICD-10-CM

## 2024-06-05 DIAGNOSIS — E11.59 HYPERTENSION ASSOCIATED WITH TYPE 2 DIABETES MELLITUS (HCC): ICD-10-CM

## 2024-06-05 DIAGNOSIS — E11.69 HYPERLIPIDEMIA ASSOCIATED WITH TYPE 2 DIABETES MELLITUS (HCC): ICD-10-CM

## 2024-06-05 DIAGNOSIS — E11.65 UNCONTROLLED TYPE 2 DIABETES MELLITUS WITH HYPERGLYCEMIA (HCC): ICD-10-CM

## 2024-06-05 NOTE — TELEPHONE ENCOUNTER
Patient came into office to request refill on   Tirzepatide (MOUNJARO) 5 MG/0.5ML Subcutaneous Solution Pen-injector  Send to   [facility] [address] [place] [address] - [address] [place] [address] [phone], [phone]

## 2024-06-06 RX ORDER — TIRZEPATIDE 5 MG/.5ML
5 INJECTION, SOLUTION SUBCUTANEOUS WEEKLY
Qty: 6 ML | Refills: 1 | Status: SHIPPED | OUTPATIENT
Start: 2024-06-06

## 2024-06-06 NOTE — TELEPHONE ENCOUNTER
LOV: 4/26/2024 with Dr. Min  RTC: 4 months  Last Relevant Labs: 3/13/2024  Filled: 2/20/2024    #6 mL with 0 refills    Future Appointments   Date Time Provider Department Center   9/5/2024 12:30 PM Rosalva Min MD [facility] [place]

## 2024-08-23 ENCOUNTER — TELEPHONE (OUTPATIENT)
Dept: INTERNAL MEDICINE CLINIC | Facility: CLINIC | Age: 49
End: 2024-08-23

## 2024-08-23 NOTE — TELEPHONE ENCOUNTER
Initiated prior authorization for MOUNJARO 5 MG/0.5 ML PEN-INJECTORS through covermymeds.  Awaiting PA outcome.  KEY: [id number]

## 2024-09-05 ENCOUNTER — OFFICE VISIT (OUTPATIENT)
Dept: INTERNAL MEDICINE CLINIC | Facility: CLINIC | Age: 49
End: 2024-09-05
Payer: COMMERCIAL

## 2024-09-05 VITALS
HEIGHT: 73.5 IN | SYSTOLIC BLOOD PRESSURE: 124 MMHG | WEIGHT: 298.19 LBS | HEART RATE: 88 BPM | DIASTOLIC BLOOD PRESSURE: 76 MMHG | RESPIRATION RATE: 16 BRPM | OXYGEN SATURATION: 99 % | BODY MASS INDEX: 38.68 KG/M2 | TEMPERATURE: 98 F

## 2024-09-05 DIAGNOSIS — I15.2 HYPERTENSION ASSOCIATED WITH TYPE 2 DIABETES MELLITUS (HCC): Chronic | ICD-10-CM

## 2024-09-05 DIAGNOSIS — M25.572 CHRONIC PAIN OF LEFT ANKLE: ICD-10-CM

## 2024-09-05 DIAGNOSIS — E11.59 HYPERTENSION ASSOCIATED WITH TYPE 2 DIABETES MELLITUS (HCC): Chronic | ICD-10-CM

## 2024-09-05 DIAGNOSIS — E11.69 HYPERLIPIDEMIA ASSOCIATED WITH TYPE 2 DIABETES MELLITUS (HCC): Chronic | ICD-10-CM

## 2024-09-05 DIAGNOSIS — E78.5 HYPERLIPIDEMIA ASSOCIATED WITH TYPE 2 DIABETES MELLITUS (HCC): Chronic | ICD-10-CM

## 2024-09-05 DIAGNOSIS — E11.65 UNCONTROLLED TYPE 2 DIABETES MELLITUS WITH HYPERGLYCEMIA (HCC): Chronic | ICD-10-CM

## 2024-09-05 DIAGNOSIS — G89.29 CHRONIC PAIN OF LEFT ANKLE: ICD-10-CM

## 2024-09-05 DIAGNOSIS — Z00.00 ENCOUNTER FOR PREVENTATIVE ADULT HEALTH CARE EXAMINATION: Primary | ICD-10-CM

## 2024-09-05 LAB — HEMOGLOBIN A1C: 6 % (ref 4.3–5.6)

## 2024-09-05 PROCEDURE — 3072F LOW RISK FOR RETINOPATHY: CPT | Performed by: INTERNAL MEDICINE

## 2024-09-05 PROCEDURE — 3074F SYST BP LT 130 MM HG: CPT | Performed by: INTERNAL MEDICINE

## 2024-09-05 PROCEDURE — 3078F DIAST BP <80 MM HG: CPT | Performed by: INTERNAL MEDICINE

## 2024-09-05 PROCEDURE — 3044F HG A1C LEVEL LT 7.0%: CPT | Performed by: INTERNAL MEDICINE

## 2024-09-05 PROCEDURE — 83036 HEMOGLOBIN GLYCOSYLATED A1C: CPT | Performed by: INTERNAL MEDICINE

## 2024-09-05 PROCEDURE — 3008F BODY MASS INDEX DOCD: CPT | Performed by: INTERNAL MEDICINE

## 2024-09-05 PROCEDURE — 99396 PREV VISIT EST AGE 40-64: CPT | Performed by: INTERNAL MEDICINE

## 2024-09-05 NOTE — PATIENT INSTRUCTIONS
- Diabetes looks great.  Keep up the good work!   - Will continue Mounjaro at current dose  - Blood pressure looks good; will stop lisinopril for now.  - Will continue atorvastatin for cholesterol  - Get rest of blood tests done when you are fasting (water and medications only for 8 hours)  - Schedule x-ray of left foot/ankle.  Call central scheduling at [phone] to schedule.  Will decide next steps based on x-ray results.  - Follow up with Orthopedics/Dr. Fox for knee pains:  Dr. Fly Fox  [address] [place] [id number]  [phone]    - Follow up with me in 6 months for diabetes/chronic issues; follow up earlier as needed.    It was a pleasure seeing you in the clinic today.  Thank you for choosing the [facility] [place] office for your healthcare needs. Please call at [phone] with any questions or concerns.    Rosalva Min MD

## 2024-09-05 NOTE — PROGRESS NOTES
Juli Santamaria is a 49 year old male.   HPI:     Chief Complaint   Patient presents with    CPX     Patient presents for CPX/wellness examination.  Diet: Generally adhering to diabetic diet  Exercise: Very physical/active at work  Vision: saw optometrist last week ([employer]) - has report with him today; no diabetic retinopathy.  Dental: Due for cleaning     Acute issues:  Dealing with pain/swelling in left ankle - especially when he sits down.  Pain in left ankle for 5 minutes, better once he is moving around.  Feels it early in the morning.      Chronic issues:  Hypertension - at goal blood pressure; actually has not been taking his lisinopril for months.  Hyperlipidemia - on statin therapy, tolerating.  DM II - on Mounjaro; tolerating without issues.    Past medical, family, surgical and social history were reviewed as listed in the chart, and are unchanged from previous visit on 4/26/2024  REVIEW OF SYSTEMS:   GENERAL/ const: no fevers/chills, no unintentional weight loss  SKIN: denies any unusual skin lesions  EYES:no vision problems  HEENT: denies sinus pain or sinus tenderness  LUNGS: denies shortness of breath   CARDIOVASCULAR: denies chest pain  GI: denies nausea/emesis/ abdominal pain diarrhea constipation  : denies dysuria   MUSCULOSKELETAL: chronic knee pains, left ankle/foot pain  NEURO: denies headaches  PSYCHIATRIC: denies issues  ENDOCRINE: no hot/cold intolerance  ALLERGY:   Allergies   Allergen Reactions    Iodides (Topical) TONGUE SWELLING and Tightness in Throat    Shellfish-Derived Products      PAST HISTORY:     Current Outpatient Medications:     Tirzepatide (MOUNJARO) 5 MG/0.5ML Subcutaneous Solution Pen-injector, Inject 5 mg into the skin once a week., Disp: 6 mL, Rfl: 1    atorvastatin 10 MG Oral Tab, Take 1 tablet (10 mg total) by mouth nightly., Disp: 90 tablet, Rfl: 1    lisinopril 10 MG Oral Tab, Take 1 tablet (10 mg total) by mouth daily. (Patient not taking: Reported on 9/5/2024),  Disp: 90 tablet, Rfl: 1  Medical:  has a past medical history of Diabetes (HCC), Essential hypertension, Hyperlipidemia, and SHARIF (obstructive sleep apnea) (12/29/2020 HST).  Surgical:  has a past surgical history that includes elbow surgery (Right, age 9); appendectomy; knee surgery (Right, 2004); other surgical history (01/2019); and other surgical history (10/2019).  Family: family history includes Cancer in his father; Healthy in his brother, brother, and sister; Hypertension in his mother.  Social:  reports that he has never smoked. He has never used smokeless tobacco. He reports current alcohol use. He reports that he does not currently use drugs after having used the following drugs: Cannabis. Frequency: 0.50 times per week.  Wt Readings from Last 6 Encounters:   09/05/24 298 lb 3.2 oz (135.3 kg)   04/26/24 (!) 302 lb 6.4 oz (137.2 kg)   03/07/24 (!) 312 lb 9.6 oz (141.8 kg)   06/07/23 (!) 309 lb (140.2 kg)   06/03/23 (!) 311 lb (141.1 kg)   05/02/23 (!) 316 lb (143.3 kg)     EXAM:   /76 (BP Location: Left arm, Patient Position: Sitting, Cuff Size: large)   Pulse 88   Temp 97.8 °F (36.6 °C) (Temporal)   Resp 16   Ht 6' 1.5\" (1.867 m)   Wt 298 lb 3.2 oz (135.3 kg)   SpO2 99%   BMI 38.81 kg/m²   GENERAL: Alert and oriented, well developed, well nourished,in no apparent distress  SKIN: no rashes,no suspicious lesions  HEENT: atraumatic, PERRLA, EOMI, normal lid and conjunctiva, normal external canals and tympanic membranes bilaterally  NECK: supple, no jvd, no thyromegaly, no palpable/tender cervical lymphadenopathy  LUNGS: clear to auscultation bilaterally, no wheezing/rubs  CARDIO: RRR without murmurs.  No clubbing, cyanosis or edema.  GI: soft non tender nondistended no hepatosplenomegaly, bowel sounds throughout  NEURO: CN II-XII intact, 5/5 strength all extremities  Bilateral barefoot skin diabetic exam is normal, visualized feet and the appearance is normal.  Bilateral monofilament/sensation of  both feet is normal.  Pulsation pedal pulse exam of both lower legs/feet is normal as well.  MS: Full ROM, some tenderness to palpation left lateral malleolus  PSYCH: pleasant, appropriate mood and affect  ASSESSMENT AND PLAN:   1.  Encounter for preventative adult health examination  Age appropriate health guidance and counseling provided.  Screening labs done in the spring.  Body mass index is 38.81 kg/m².  Colonoscopy done last year, next due 2033.  Body mass index is 38.81 kg/m².  Continue focus on lifestyle modification.    2. Hypertension associated with type 2 diabetes mellitus ([facility])  At goal blood pressure.  Has actually been off his lisinopril for months.  Will monitor without medication for now.    3. Uncontrolled type 2 diabetes mellitus with hyperglycemia ([facility])  Good control - A1c 6.0% today in office. Will continue Mounjaro 5 mg weekly.  Our retinal scanner showed possible abnormalities in the spring; however he had dilated eye exam with optometrist last week (has report with him) - no diabetic retinopathy.  Foot exam done today.  Up to date with microalbumin.  On statin therapy.  - POC Hemoglobin A1C    4. Hyperlipidemia associated with type 2 diabetes mellitus ([facility])  Lipid panel order in system.  Continue atorvastatin 10 mg qhs.    5. Chronic pain of left ankle  Left ankle/upper foot pain.  Worse when getting up from sitting/standing, in the mornings and evenings.  Will check XR foot/ankle.  May refer to Podiatry based on results and course of symptoms.   - XR FOOT WEIGHTBEARING (2 VIEWS), LEFT   (CPT=73620); Future  - XR ANKLE (MIN 3 VIEWS), LEFT (CPT=73610); Future    Patient Care Team:  Rosalva Min MD as PCP - General (Internal Medicine)  Matt Dick MD as Consulting Physician (SURGERY, ORTHOPEDIC)  The patient indicates understanding of these issues and agrees to the plan.  The patient is asked to return to clinic in 6 months for follow up on chronic issues, or earlier if acute  issues arise.    Rosalva Min MD

## 2024-09-12 ENCOUNTER — MED REC SCAN ONLY (OUTPATIENT)
Dept: INTERNAL MEDICINE CLINIC | Facility: CLINIC | Age: 49
End: 2024-09-12

## 2024-09-26 ENCOUNTER — HOSPITAL ENCOUNTER (OUTPATIENT)
Dept: GENERAL RADIOLOGY | Age: 49
Discharge: HOME | End: 2024-09-26
Attending: INTERNAL MEDICINE
Payer: COMMERCIAL

## 2024-09-26 ENCOUNTER — LAB ENCOUNTER (OUTPATIENT)
Dept: LAB | Age: 49
End: 2024-09-26
Attending: INTERNAL MEDICINE
Payer: COMMERCIAL

## 2024-09-26 DIAGNOSIS — G89.29 CHRONIC PAIN OF LEFT ANKLE: ICD-10-CM

## 2024-09-26 DIAGNOSIS — M25.572 CHRONIC PAIN OF LEFT ANKLE: ICD-10-CM

## 2024-09-26 DIAGNOSIS — E78.5 HYPERLIPIDEMIA ASSOCIATED WITH TYPE 2 DIABETES MELLITUS (HCC): Chronic | ICD-10-CM

## 2024-09-26 DIAGNOSIS — E11.65 UNCONTROLLED TYPE 2 DIABETES MELLITUS WITH HYPERGLYCEMIA (HCC): Chronic | ICD-10-CM

## 2024-09-26 DIAGNOSIS — E11.69 HYPERLIPIDEMIA ASSOCIATED WITH TYPE 2 DIABETES MELLITUS (HCC): Chronic | ICD-10-CM

## 2024-09-26 LAB
ANION GAP SERPL CALC-SCNC: 5 MMOL/L (ref 0–18)
BUN BLD-MCNC: 17 MG/DL (ref 9–23)
CALCIUM BLD-MCNC: 9.9 MG/DL (ref 8.7–10.4)
CHLORIDE SERPL-SCNC: 106 MMOL/L (ref 98–112)
CHOLEST SERPL-MCNC: 164 MG/DL (ref ?–200)
CO2 SERPL-SCNC: 27 MMOL/L (ref 21–32)
CREAT BLD-MCNC: 1.32 MG/DL
EGFRCR SERPLBLD CKD-EPI 2021: 67 ML/MIN/1.73M2 (ref 60–?)
FASTING PATIENT LIPID ANSWER: YES
FASTING STATUS PATIENT QL REPORTED: YES
GLUCOSE BLD-MCNC: 87 MG/DL (ref 70–99)
HDLC SERPL-MCNC: 52 MG/DL (ref 40–59)
LDLC SERPL CALC-MCNC: 104 MG/DL (ref ?–100)
NONHDLC SERPL-MCNC: 112 MG/DL (ref ?–130)
OSMOLALITY SERPL CALC.SUM OF ELEC: 287 MOSM/KG (ref 275–295)
POTASSIUM SERPL-SCNC: 4.1 MMOL/L (ref 3.5–5.1)
SODIUM SERPL-SCNC: 138 MMOL/L (ref 136–145)
TRIGL SERPL-MCNC: 38 MG/DL (ref 30–149)
VLDLC SERPL CALC-MCNC: 6 MG/DL (ref 0–30)

## 2024-09-26 PROCEDURE — 80061 LIPID PANEL: CPT

## 2024-09-26 PROCEDURE — 80048 BASIC METABOLIC PNL TOTAL CA: CPT

## 2024-09-26 PROCEDURE — 36415 COLL VENOUS BLD VENIPUNCTURE: CPT

## 2024-09-26 PROCEDURE — 73630 X-RAY EXAM OF FOOT: CPT | Performed by: INTERNAL MEDICINE

## 2024-09-26 PROCEDURE — 73610 X-RAY EXAM OF ANKLE: CPT | Performed by: INTERNAL MEDICINE

## 2024-09-27 DIAGNOSIS — M25.572 CHRONIC PAIN OF LEFT ANKLE: Primary | ICD-10-CM

## 2024-09-27 DIAGNOSIS — G89.29 CHRONIC PAIN OF LEFT ANKLE: Primary | ICD-10-CM

## 2024-10-03 DIAGNOSIS — G89.29 CHRONIC PAIN OF LEFT ANKLE: Primary | ICD-10-CM

## 2024-10-03 DIAGNOSIS — M25.572 CHRONIC PAIN OF LEFT ANKLE: Primary | ICD-10-CM

## 2024-10-23 ENCOUNTER — TELEPHONE (OUTPATIENT)
Dept: INTERNAL MEDICINE CLINIC | Facility: CLINIC | Age: 49
End: 2024-10-23

## 2024-10-23 DIAGNOSIS — M79.642 LEFT HAND PAIN: Primary | ICD-10-CM

## 2024-10-23 NOTE — TELEPHONE ENCOUNTER
RP: OV or order for left hand/thumb?     Do not see mention of thumb pain in LOV 9/5/24--  Acute issues:  Dealing with pain/swelling in left ankle - especially when he sits down.  Pain in left ankle for 5 minutes, better once he is moving around.  Feels it early in the morning.

## 2024-10-23 NOTE — TELEPHONE ENCOUNTER
Pt asking for additional X-ray for his L hand. Thumb has been having ongoing pain. Ok to provide order? Need apt? If so, ok to schedule 15min slot? He is going out of town Oct 29th,    Pt requesting cb with update.

## 2024-10-24 NOTE — TELEPHONE ENCOUNTER
Left detailed message for patient that xray was ordered and he can schedule to get that done and we will relay results to him once they are reviewed by Dr. Min. Left message to call back with any questions/concerns.

## 2024-10-26 ENCOUNTER — HOSPITAL ENCOUNTER (OUTPATIENT)
Dept: GENERAL RADIOLOGY | Age: 49
Discharge: HOME | End: 2024-10-26
Attending: INTERNAL MEDICINE
Payer: COMMERCIAL

## 2024-10-26 DIAGNOSIS — M79.642 LEFT HAND PAIN: ICD-10-CM

## 2024-10-26 PROCEDURE — 73130 X-RAY EXAM OF HAND: CPT | Performed by: INTERNAL MEDICINE

## 2024-10-28 DIAGNOSIS — M79.642 LEFT HAND PAIN: Primary | ICD-10-CM

## 2024-10-28 DIAGNOSIS — M18.12 OSTEOARTHRITIS OF LEFT THUMB: ICD-10-CM

## 2024-12-01 DIAGNOSIS — I15.2 HYPERTENSION ASSOCIATED WITH TYPE 2 DIABETES MELLITUS (HCC): ICD-10-CM

## 2024-12-01 DIAGNOSIS — E78.5 HYPERLIPIDEMIA ASSOCIATED WITH TYPE 2 DIABETES MELLITUS (HCC): ICD-10-CM

## 2024-12-01 DIAGNOSIS — E11.65 UNCONTROLLED TYPE 2 DIABETES MELLITUS WITH HYPERGLYCEMIA (HCC): ICD-10-CM

## 2024-12-01 DIAGNOSIS — E11.59 HYPERTENSION ASSOCIATED WITH TYPE 2 DIABETES MELLITUS (HCC): ICD-10-CM

## 2024-12-01 DIAGNOSIS — E66.01 MORBID OBESITY WITH BMI OF 40.0-44.9, ADULT (HCC): ICD-10-CM

## 2024-12-01 DIAGNOSIS — E11.69 HYPERLIPIDEMIA ASSOCIATED WITH TYPE 2 DIABETES MELLITUS (HCC): ICD-10-CM

## 2024-12-04 RX ORDER — TIRZEPATIDE 5 MG/.5ML
5 INJECTION, SOLUTION SUBCUTANEOUS WEEKLY
Qty: 12 ML | Refills: 0 | Status: SHIPPED | OUTPATIENT
Start: 2024-12-04

## 2024-12-04 NOTE — TELEPHONE ENCOUNTER
PASS    LOV 9/5/24  FU 6 MONTHS    Future Appointments   Date Time Provider Department Center   3/6/2025 12:30 PM Rosalva Min MD [facility]

## 2025-01-02 ENCOUNTER — TELEPHONE (OUTPATIENT)
Facility: CLINIC | Age: 50
End: 2025-01-02

## 2025-01-02 NOTE — TELEPHONE ENCOUNTER
New pt appt for left hand pain from artrithis imaging avail in epic  Future Appointments  1/14/2025  8:30 AM    Ed Ivey MD       [facility]        [facility]  3/10/2025  12:30 PM   Rosalva Min MD        [facility]               [facility]

## 2025-01-14 ENCOUNTER — OFFICE VISIT (OUTPATIENT)
Dept: ORTHOPEDICS CLINIC | Facility: CLINIC | Age: 50
End: 2025-01-14
Payer: COMMERCIAL

## 2025-01-14 ENCOUNTER — HOSPITAL ENCOUNTER (OUTPATIENT)
Dept: GENERAL RADIOLOGY | Age: 50
Discharge: HOME | End: 2025-01-14
Attending: STUDENT IN AN ORGANIZED HEALTH CARE EDUCATION/TRAINING PROGRAM
Payer: COMMERCIAL

## 2025-01-14 VITALS — HEIGHT: 73.5 IN | BODY MASS INDEX: 38.65 KG/M2 | WEIGHT: 298 LBS

## 2025-01-14 DIAGNOSIS — M79.645 PAIN OF LEFT THUMB: ICD-10-CM

## 2025-01-14 DIAGNOSIS — M18.12 PRIMARY OSTEOARTHRITIS OF FIRST CARPOMETACARPAL JOINT OF LEFT HAND: Primary | ICD-10-CM

## 2025-01-14 PROCEDURE — 73140 X-RAY EXAM OF FINGER(S): CPT | Performed by: STUDENT IN AN ORGANIZED HEALTH CARE EDUCATION/TRAINING PROGRAM

## 2025-01-14 NOTE — PROGRESS NOTES
Clinic Note     Allergies[1]    CC: left base of thumb pain.    HPI: This 49 year old RHD male presents with left base of thumb pain. The pain is aggravated by use. The pain is relieved by rest. The patient has an arduous job and frequently lifts heavy objects and states this can aggravate his pain. No numbness or tingling. No prior surgeries to the left upper extremity.    Onset: gradual, present for 6 months  Pain Level: moderate  Pain Characterization: aching, sharp    Treatments Tried: OTC brace, does not wear consistently    Occupation:  and     Past Medical History:    Diabetes (HCC)    Essential hypertension    Hyperlipidemia    SHARIF (obstructive sleep apnea)    AHI 20.8  Supine AHI 29 non-supine AHI 9 Sao2 Henrry 77%      Past Surgical History:   Procedure Laterality Date    Appendectomy      Elbow surgery Right age 9    pins placed    Knee surgery Right 2004    Scope hx and possible R medial meniscectomy    Other surgical history  01/2019    L shoulder arthroscopy with RCR    Other surgical history  10/2019    R knee arthroscopy     Current Outpatient Medications   Medication Sig Dispense Refill    MOUNJARO 5 MG/0.5ML Subcutaneous Solution Auto-injector INJECT 5MG INTO THE SKIN ONCE A WEEK 12 mL 0    atorvastatin 10 MG Oral Tab Take 1 tablet (10 mg total) by mouth nightly. 90 tablet 1    lisinopril 10 MG Oral Tab Take 1 tablet (10 mg total) by mouth daily. (Patient not taking: Reported on 9/5/2024) 90 tablet 1     Family History   Problem Relation Age of Onset    Cancer Father         lung?    Hypertension Mother     Other (Healthy) Sister     Other (Healthy) Brother     Other (Healthy) Brother      Social History     Occupational History    Not on file   Tobacco Use    Smoking status: Never    Smokeless tobacco: Never   Vaping Use    Vaping status: Never Used   Substance and Sexual Activity    Alcohol use: Yes     Comment: occ    Drug use: Not Currently     Frequency: 0.5  times per week     Types: Cannabis    Sexual activity: Not on file        Review of Systems:  Negative unless stated in HPI.      Physical Exam:  Ht 6' 1.5\" (1.867 m)   Wt 298 lb (135.2 kg)   BMI 38.78 kg/m²     Constitutional: NAD. AOx3. Well-developed and Well-nourished.   Psychiatric: Normal mood/ affect/ behavior. Judgment and thought content normal.     left Upper Extremity:     + tenderness to palpation at the thumb CMC joint  0 degrees of hyperextension of the MCP joint  + grind test, (axial loading of the CMC joint)    ROM: Symmetric to contralateral extremity.    - Negative Finklestein's test  - No significant swelling over the 1st dorsal compartment  - No pain with resisted thumb extension  - No tenderness at the A1 pulley  - No locking and triggering of the thumb with ROM  - Sensation present to light touch in median, radial and ulnar sensory nerve distributions  - Motor intact to AIN, PIN, Ulnar motor nerve distributions  - Able to make a full fist  - Able to fully extend digits  - Skin intact, warm and well perfused with brisk capillary refill    Diagnostic Studies:  I reviewed the images independently from the professional interpretation, and discussed my interpretation of the pertinent findings with patient/family.    1/14/2025 XR left thumb and wrist 3V: On my independent review of the radiographs, there is evidence of CMC osteoarthritis, Eaton Stage 2. Mild to moderate joint space narrowing, osteophyte formation, and subchondral sclerosis.     Assessment/Plan:  49 year old male with left thumb CMC osteoarthritis, Eaton Stage 2.    I reviewed the patient's electronic medical record, including the pertinent office notes, medical/surgical history, medications and images. I specifically reviewed the images noted above, independently and discussed my independent interpretation of these images in combination with the pertinent positive and negative findings in my clinical exam with the patient.    Today  I discussed with the patient the pathophysiology and pathoanatomy of 1st CMC arthritis (basal joint arthritis), the natural history and the prognosis with various treatment options, both non-operative and operative. I reviewed the radiographs and clinical exam findings and their significance with the patient. Non-operative modalities include activity modification, NSAIDs, splints/braces and corticosteroid injections. Indications for surgical treatment were explained, and I explained the surgical procedures including CMC denervation and CMC arthroplasty, the expected post-op courses, surgical risks and alternatives. Education and counseling was given for the above diagnosis.    At this time Juli Santamaria is interested in proceeding with a removable thumb spica velcro brace. They will wear this at all times for the next 4 weeks, after which they will use either this brace or a push MetaGrip CMC thumb brace as needed. Should symptoms persist we may discuss one of the aforementioned treatment options in the future.     Follow Up: 6-8 weeks should symptoms not resolve fully    Ed Ivey MD   Hand, Wrist, & Elbow Surgery  [email or web address]       [1]   Allergies  Allergen Reactions    Iodides (Topical) TONGUE SWELLING and Tightness in Throat    Shellfish-Derived Products

## 2025-02-20 DIAGNOSIS — E78.5 HYPERLIPIDEMIA ASSOCIATED WITH TYPE 2 DIABETES MELLITUS (HCC): ICD-10-CM

## 2025-02-20 DIAGNOSIS — E11.65 UNCONTROLLED TYPE 2 DIABETES MELLITUS WITH HYPERGLYCEMIA (HCC): ICD-10-CM

## 2025-02-20 DIAGNOSIS — E11.69 HYPERLIPIDEMIA ASSOCIATED WITH TYPE 2 DIABETES MELLITUS (HCC): ICD-10-CM

## 2025-02-20 DIAGNOSIS — E66.01 MORBID OBESITY WITH BMI OF 40.0-44.9, ADULT (HCC): ICD-10-CM

## 2025-02-20 DIAGNOSIS — E11.59 HYPERTENSION ASSOCIATED WITH TYPE 2 DIABETES MELLITUS (HCC): ICD-10-CM

## 2025-02-20 DIAGNOSIS — I15.2 HYPERTENSION ASSOCIATED WITH TYPE 2 DIABETES MELLITUS (HCC): ICD-10-CM

## 2025-02-24 RX ORDER — TIRZEPATIDE 5 MG/.5ML
5 INJECTION, SOLUTION SUBCUTANEOUS WEEKLY
Qty: 6 ML | Refills: 1 | Status: SHIPPED | OUTPATIENT
Start: 2025-02-24

## 2025-02-24 NOTE — TELEPHONE ENCOUNTER
LOV: 9/5/2024 with Dr. Min  RTC: 6 months  Last Relevant Labs: September 2024  Filled: 12/4/2024    #12 mL with 0 refills    Future Appointments   Date Time Provider Department Center   2/25/2025 12:30 PM Ed Ivey MD [facility]   3/10/2025 12:30 PM Rosalva Min MD [facility]

## 2025-02-25 ENCOUNTER — OFFICE VISIT (OUTPATIENT)
Dept: ORTHOPEDICS CLINIC | Facility: CLINIC | Age: 50
End: 2025-02-25
Payer: COMMERCIAL

## 2025-02-25 DIAGNOSIS — M18.12 PRIMARY OSTEOARTHRITIS OF FIRST CARPOMETACARPAL JOINT OF LEFT HAND: Primary | ICD-10-CM

## 2025-02-25 PROCEDURE — 99213 OFFICE O/P EST LOW 20 MIN: CPT | Performed by: STUDENT IN AN ORGANIZED HEALTH CARE EDUCATION/TRAINING PROGRAM

## 2025-02-25 NOTE — PROGRESS NOTES
Clinic Note     Allergies[1]    CC: left base of thumb pain.    HPI:   From prior visit 1/14/25:  This 49 year old RHD male presents with left base of thumb pain. The pain is aggravated by use. The pain is relieved by rest. The patient has an arduous job and frequently lifts heavy objects and states this can aggravate his pain. No numbness or tingling. No prior surgeries to the left upper extremity.    Onset: gradual, present for 6 months  Pain Level: moderate  Pain Characterization: aching, sharp    Treatments Tried: OTC brace, does not wear consistently    Occupation:  and     Today's visit 2/25/25:  Juli returns today having tried a period of immobilization for his left thumb. Unfortunately he does not feel as though thumb spica bracing has helped his symptoms. He continues to feel pain over the base of the left thumb.     Past Medical History:    Diabetes (HCC)    Essential hypertension    Hyperlipidemia    SHARIF (obstructive sleep apnea)    AHI 20.8  Supine AHI 29 non-supine AHI 9 Sao2 Henrry 77%      Past Surgical History:   Procedure Laterality Date    Appendectomy      Elbow surgery Right age 9    pins placed    Knee surgery Right 2004    Scope hx and possible R medial meniscectomy    Other surgical history  01/2019    L shoulder arthroscopy with RCR    Other surgical history  10/2019    R knee arthroscopy     Current Outpatient Medications   Medication Sig Dispense Refill    Tirzepatide (MOUNJARO) 5 MG/0.5ML Subcutaneous Solution Auto-injector Inject 5 mg into the skin once a week. 6 mL 1    atorvastatin 10 MG Oral Tab Take 1 tablet (10 mg total) by mouth nightly. 90 tablet 1    lisinopril 10 MG Oral Tab Take 1 tablet (10 mg total) by mouth daily. (Patient not taking: Reported on 9/5/2024) 90 tablet 1     Family History   Problem Relation Age of Onset    Cancer Father         lung?    Hypertension Mother     Other (Healthy) Sister     Other (Healthy) Brother     Other  (Healthy) Brother      Social History     Occupational History    Not on file   Tobacco Use    Smoking status: Never    Smokeless tobacco: Never   Vaping Use    Vaping status: Never Used   Substance and Sexual Activity    Alcohol use: Yes     Comment: occ    Drug use: Not Currently     Frequency: 0.5 times per week     Types: Cannabis    Sexual activity: Not on file        Review of Systems:  Negative unless stated in HPI.      Physical Exam:      Constitutional: NAD. AOx3. Well-developed and Well-nourished.   Psychiatric: Normal mood/ affect/ behavior. Judgment and thought content normal.     left Upper Extremity:     + tenderness to palpation at the thumb CMC joint  0 degrees of hyperextension of the MCP joint  + grind test, (axial loading of the CMC joint)    ROM: Symmetric to contralateral extremity.    - Negative Finklestein's test  - No significant swelling over the 1st dorsal compartment  - No pain with resisted thumb extension  - No tenderness at the A1 pulley  - No locking and triggering of the thumb with ROM  - Sensation present to light touch in median, radial and ulnar sensory nerve distributions  - Motor intact to AIN, PIN, Ulnar motor nerve distributions  - Able to make a full fist  - Able to fully extend digits  - Skin intact, warm and well perfused with brisk capillary refill    Diagnostic Studies:  I reviewed the images independently from the professional interpretation, and discussed my interpretation of the pertinent findings with patient/family.    1/14/2025 XR left thumb and wrist 3V: On my independent review of the radiographs, there is evidence of CMC osteoarthritis, Eaton Stage 2. Mild to moderate joint space narrowing, osteophyte formation, and subchondral sclerosis.     Assessment/Plan:  49 year old male with left thumb CMC osteoarthritis, Eaton Stage 2.    I reviewed the patient's electronic medical record, including the pertinent office notes, medical/surgical history, medications and  images. I specifically reviewed the images noted above, independently and discussed my independent interpretation of these images in combination with the pertinent positive and negative findings in my clinical exam with the patient.    Today I again discussed with the patient the pathophysiology and pathoanatomy of 1st CMC arthritis (basal joint arthritis), the natural history and the prognosis with various treatment options, both non-operative and operative. I reviewed the radiographs and clinical exam findings and their significance with the patient. Non-operative modalities include activity modification, NSAIDs, splints/braces and corticosteroid injections. Indications for surgical treatment were explained, and I explained the surgical procedures including CMC denervation and CMC arthroplasty, the expected post-op courses, surgical risks and alternatives. Education and counseling was given for the above diagnosis.    At this time Juli Santamaria is interested in proceeding with a course of occupational therapy which I believe is quite reasonable. I prescribed this for him today. He will also continue wearing the left thumb spica brace.     Follow Up: 6-8 weeks for repeat evaluation    Ed Ivey MD   Hand, Wrist, & Elbow Surgery  [email or web address]         [1]   Allergies  Allergen Reactions    Iodides (Topical) TONGUE SWELLING and Tightness in Throat    Shellfish-Derived Products

## 2025-03-21 ENCOUNTER — OFFICE VISIT (OUTPATIENT)
Dept: INTERNAL MEDICINE CLINIC | Facility: CLINIC | Age: 50
End: 2025-03-21
Payer: COMMERCIAL

## 2025-03-21 VITALS
HEIGHT: 73.5 IN | TEMPERATURE: 98 F | HEART RATE: 82 BPM | OXYGEN SATURATION: 99 % | SYSTOLIC BLOOD PRESSURE: 134 MMHG | BODY MASS INDEX: 39.56 KG/M2 | RESPIRATION RATE: 16 BRPM | WEIGHT: 305 LBS | DIASTOLIC BLOOD PRESSURE: 80 MMHG

## 2025-03-21 DIAGNOSIS — E11.69 HYPERLIPIDEMIA ASSOCIATED WITH TYPE 2 DIABETES MELLITUS (HCC): Chronic | ICD-10-CM

## 2025-03-21 DIAGNOSIS — E78.5 HYPERLIPIDEMIA ASSOCIATED WITH TYPE 2 DIABETES MELLITUS (HCC): Chronic | ICD-10-CM

## 2025-03-21 DIAGNOSIS — Z12.5 SCREENING FOR MALIGNANT NEOPLASM OF PROSTATE: ICD-10-CM

## 2025-03-21 DIAGNOSIS — E66.01 SEVERE OBESITY (BMI 35.0-39.9) WITH COMORBIDITY (HCC): ICD-10-CM

## 2025-03-21 DIAGNOSIS — E11.59 HYPERTENSION ASSOCIATED WITH TYPE 2 DIABETES MELLITUS (HCC): Chronic | ICD-10-CM

## 2025-03-21 DIAGNOSIS — I15.2 HYPERTENSION ASSOCIATED WITH TYPE 2 DIABETES MELLITUS (HCC): Chronic | ICD-10-CM

## 2025-03-21 DIAGNOSIS — E11.9 CONTROLLED TYPE 2 DIABETES MELLITUS WITHOUT COMPLICATION, WITHOUT LONG-TERM CURRENT USE OF INSULIN (HCC): Primary | ICD-10-CM

## 2025-03-21 LAB — HEMOGLOBIN A1C: 6.2 % (ref 4.3–5.6)

## 2025-03-21 PROCEDURE — 83036 HEMOGLOBIN GLYCOSYLATED A1C: CPT | Performed by: INTERNAL MEDICINE

## 2025-03-21 PROCEDURE — 3044F HG A1C LEVEL LT 7.0%: CPT | Performed by: INTERNAL MEDICINE

## 2025-03-21 PROCEDURE — 99214 OFFICE O/P EST MOD 30 MIN: CPT | Performed by: INTERNAL MEDICINE

## 2025-03-21 PROCEDURE — 3079F DIAST BP 80-89 MM HG: CPT | Performed by: INTERNAL MEDICINE

## 2025-03-21 PROCEDURE — 3075F SYST BP GE 130 - 139MM HG: CPT | Performed by: INTERNAL MEDICINE

## 2025-03-21 PROCEDURE — 3008F BODY MASS INDEX DOCD: CPT | Performed by: INTERNAL MEDICINE

## 2025-03-21 RX ORDER — LISINOPRIL 10 MG/1
10 TABLET ORAL DAILY
Qty: 90 TABLET | Refills: 3 | Status: SHIPPED | OUTPATIENT
Start: 2025-03-21

## 2025-03-21 NOTE — PATIENT INSTRUCTIONS
- Diabetes looks great. Will continue current dose of Mounjaro.  - Blood pressure looks good as well.  Lisinopril refilled  - Get rest of blood and urine tests done when fasting (water and medications only for 8 hours).  - Follow up in 6 months (September) for chronic issues; follow up earlier as needed.    It was a pleasure seeing you in the clinic today.  Thank you for choosing the [facility] office for your healthcare needs. Please call at [phone] with any questions or concerns.    Rosalva Min MD

## 2025-03-21 NOTE — PROGRESS NOTES
Juli Santamaria is a 49 year old male.   HPI:     Chief Complaint   Patient presents with    Follow - Up     6-month follow-up     Patient presents for follow up on chronic medical issues.  DM II - on Mounjaro, tolerating so far.  Hypertension - at goal blood pressure.  Hyperlipidemia - on statin therapy, tolerating.  Obesity - Body mass index is 39.69 kg/m².    Following with Ortho Hand for thumb/hand pain.    Past medical, family, surgical and social history were reviewed as listed in the chart, and are unchanged from previous visit.  REVIEW OF SYSTEMS:   GENERAL/ const: no fevers/chills, no unintentional weight loss  SKIN: denies any unusual skin lesions  EYES:no vision problems  HEENT: denies sinus pain or sinus tenderness  LUNGS: denies shortness of breath   CARDIOVASCULAR: denies chest pain  GI: denies nausea/emesis/ abdominal pain diarrhea constipation  : denies dysuria   MUSCULOSKELETAL: left hand/thumb pain  NEURO: denies headaches  PSYCHIATRIC: denies issues  ENDOCRINE: no hot/cold intolerance  ALLERGY: Allergies[1]  PAST HISTORY:     Current Outpatient Medications:     lisinopril 10 MG Oral Tab, Take 1 tablet (10 mg total) by mouth daily., Disp: 90 tablet, Rfl: 3    Tirzepatide (MOUNJARO) 5 MG/0.5ML Subcutaneous Solution Auto-injector, Inject 5 mg into the skin once a week., Disp: 6 mL, Rfl: 1    atorvastatin 10 MG Oral Tab, Take 1 tablet (10 mg total) by mouth nightly., Disp: 90 tablet, Rfl: 1  Medical:  has a past medical history of Diabetes (HCC), Essential hypertension, Hyperlipidemia, and SHARIF (obstructive sleep apnea) (12/29/2020 HST).  Surgical:  has a past surgical history that includes elbow surgery (Right, age 9); appendectomy; knee surgery (Right, 2004); other surgical history (01/2019); and other surgical history (10/2019).  Family: family history includes Cancer in his father; Healthy in his brother, brother, and sister; Hypertension in his mother.  Social:  reports that he has never smoked.  He has never used smokeless tobacco. He reports current alcohol use. He reports that he does not currently use drugs after having used the following drugs: Cannabis. Frequency: 0.50 times per week.  Wt Readings from Last 6 Encounters:   03/21/25 (!) 305 lb (138.3 kg)   01/14/25 298 lb (135.2 kg)   09/05/24 298 lb 3.2 oz (135.3 kg)   04/26/24 (!) 302 lb 6.4 oz (137.2 kg)   03/07/24 (!) 312 lb 9.6 oz (141.8 kg)   06/07/23 (!) 309 lb (140.2 kg)     EXAM:   /80 (BP Location: Right arm, Patient Position: Sitting, Cuff Size: large)   Pulse 82   Temp 98 °F (36.7 °C) (Temporal)   Resp 16   Ht 6' 1.5\" (1.867 m)   Wt (!) 305 lb (138.3 kg)   SpO2 99%   BMI 39.69 kg/m²   GENERAL: Alert and oriented, well developed, well nourished,in no apparent distress  SKIN: no rashes,no suspicious lesions  HEENT: atraumatic, PERRLA, EOMI, normal lid and conjunctiva  NECK: supple, no jvd, no thyromegaly  LUNGS: clear to auscultation bilaterally, no wheezing/rubs  CARDIO: RRR without murmurs.  No clubbing, cyanosis or edema.  GI: soft non tender nondistended no hepatosplenomegaly, bowel sounds throughout  NEURO: CN II-XII intact, 5/5 strength all extremities  Bilateral barefoot skin diabetic exam is normal, visualized feet and the appearance is normal.  Bilateral monofilament/sensation of both feet is normal.  Pulsation pedal pulse exam of both lower legs/feet is normal as well.  PSYCH: pleasant, appropriate mood and affect  ASSESSMENT AND PLAN:   1. Controlled type 2 diabetes mellitus without complication, without long-term current use of insulin (HCC)  Good control - A1c 6.2%.  Continue Mounjaro 5 mg weekly.    - POC Hemoglobin A1C  - Comp Metabolic Panel (14); Future  - Lipid Panel; Future  - Microalb/Creat Ratio, Random Urine; Future    2. Hypertension associated with type 2 diabetes mellitus (HCC)  At goal blood pressure.  Continue lisinopril 10 mg every day. Labs ordered as below.  - lisinopril 10 MG Oral Tab; Take 1 tablet  (10 mg total) by mouth daily.  Dispense: 90 tablet; Refill: 3  - CBC With Differential With Platelet; Future  - Comp Metabolic Panel (14); Future    3. Hyperlipidemia associated with type 2 diabetes mellitus (HCC)  On statin therapy.  Will check lipid panel.  Will refill statin after labs are back (on atorvastatin 10 mg qhs).  - Lipid Panel; Future    4. Severe obesity  Body mass index is 39.69 kg/m².  Comorbidities of hypertension, diabetes, hyperlipidemia.  Continue focus on lifestyle modification.    5. Screening for malignant neoplasm of prostate  PSA ordered.  - PSA Total, Screen; Future    Patient Care Team:  Rosalva Min MD as PCP - General (Internal Medicine)  Matt Dick MD as Consulting Physician (SURGERY, ORTHOPEDIC)  Ed Ivey MD (HAND SURGERY)  The patient indicates understanding of these issues and agrees to the plan.  The patient is asked to return to clinic in 6 months for follow up on chronic issues, or earlier if acute issues arise.    Rosalva Min MD           [1]   Allergies  Allergen Reactions    Iodides (Topical) TONGUE SWELLING and Tightness in Throat    Shellfish-Derived Products

## 2025-04-18 ENCOUNTER — OCC HEALTH (OUTPATIENT)
Dept: OCCUPATIONAL MEDICINE | Age: 50
End: 2025-04-18
Attending: PHYSICIAN ASSISTANT

## 2025-05-13 DIAGNOSIS — E11.69 HYPERLIPIDEMIA ASSOCIATED WITH TYPE 2 DIABETES MELLITUS (HCC): ICD-10-CM

## 2025-05-13 DIAGNOSIS — E11.65 UNCONTROLLED TYPE 2 DIABETES MELLITUS WITH HYPERGLYCEMIA (HCC): ICD-10-CM

## 2025-05-13 DIAGNOSIS — E11.59 HYPERTENSION ASSOCIATED WITH TYPE 2 DIABETES MELLITUS (HCC): ICD-10-CM

## 2025-05-13 DIAGNOSIS — E78.5 HYPERLIPIDEMIA ASSOCIATED WITH TYPE 2 DIABETES MELLITUS (HCC): ICD-10-CM

## 2025-05-13 DIAGNOSIS — I15.2 HYPERTENSION ASSOCIATED WITH TYPE 2 DIABETES MELLITUS (HCC): ICD-10-CM

## 2025-05-13 DIAGNOSIS — E66.01 MORBID OBESITY WITH BMI OF 40.0-44.9, ADULT (HCC): ICD-10-CM

## 2025-05-13 NOTE — TELEPHONE ENCOUNTER
LOV: 3/21/2025 with Dr. Min  RTC: 6 months  Last Relevant Labs: 3/21/2025 (A1C)  Filled: 2/24/2025    #6 mL with 1 refill    Future Appointments   Date Time Provider Department Center   9/23/2025 12:00 PM Rosalva Min MD [facility] [place]

## 2025-05-14 RX ORDER — TIRZEPATIDE 5 MG/.5ML
5 INJECTION, SOLUTION SUBCUTANEOUS WEEKLY
Qty: 6 ML | Refills: 1 | Status: SHIPPED | OUTPATIENT
Start: 2025-05-14

## 2025-05-28 ENCOUNTER — HOSPITAL ENCOUNTER (OUTPATIENT)
Age: 50
Discharge: HOME | End: 2025-05-28
Payer: COMMERCIAL

## 2025-05-28 VITALS
WEIGHT: 305 LBS | DIASTOLIC BLOOD PRESSURE: 89 MMHG | OXYGEN SATURATION: 99 % | BODY MASS INDEX: 40 KG/M2 | RESPIRATION RATE: 18 BRPM | SYSTOLIC BLOOD PRESSURE: 159 MMHG | HEART RATE: 72 BPM | TEMPERATURE: 98 F

## 2025-05-28 DIAGNOSIS — R39.15 URINARY URGENCY: Primary | ICD-10-CM

## 2025-05-28 LAB
BILIRUB UR QL STRIP: NEGATIVE
CLARITY UR: CLEAR
COLOR UR: YELLOW
GLUCOSE BLD-MCNC: 91 MG/DL (ref 70–99)
GLUCOSE UR STRIP-MCNC: NEGATIVE MG/DL
HGB UR QL STRIP: NEGATIVE
KETONES UR STRIP-MCNC: NEGATIVE MG/DL
LEUKOCYTE ESTERASE UR QL STRIP: NEGATIVE
NITRITE UR QL STRIP: NEGATIVE
PH UR STRIP: 6.5 [PH]
PROT UR STRIP-MCNC: NEGATIVE MG/DL
SP GR UR STRIP: 1.01
UROBILINOGEN UR STRIP-ACNC: <2 MG/DL

## 2025-05-28 PROCEDURE — 99214 OFFICE O/P EST MOD 30 MIN: CPT

## 2025-05-28 PROCEDURE — 99204 OFFICE O/P NEW MOD 45 MIN: CPT

## 2025-05-28 PROCEDURE — 87591 N.GONORRHOEAE DNA AMP PROB: CPT | Performed by: NURSE PRACTITIONER

## 2025-05-28 PROCEDURE — 87491 CHLMYD TRACH DNA AMP PROBE: CPT | Performed by: NURSE PRACTITIONER

## 2025-05-28 PROCEDURE — 87086 URINE CULTURE/COLONY COUNT: CPT | Performed by: NURSE PRACTITIONER

## 2025-05-28 PROCEDURE — 82962 GLUCOSE BLOOD TEST: CPT

## 2025-05-28 PROCEDURE — 81002 URINALYSIS NONAUTO W/O SCOPE: CPT

## 2025-05-28 RX ORDER — PHENAZOPYRIDINE HYDROCHLORIDE 200 MG/1
200 TABLET, FILM COATED ORAL 3 TIMES DAILY PRN
Qty: 6 TABLET | Refills: 0 | Status: SHIPPED | OUTPATIENT
Start: 2025-05-28 | End: 2025-06-04

## 2025-05-28 NOTE — ED PROVIDER NOTES
Patient Seen in: [facility]        History  Chief Complaint   Patient presents with    Urinary Symptoms     Stated Complaint: uti    Subjective:   HPI            49-year-old male with diabetes, hypertension, dyslipidemia presents today with complaints of urinary urgency.  Patient states it does not hurt or burn when he urinates but he feels like he has to urinate.  Patient states he has been using Mounjaro for diabetes.  Patient denies any STI exposure.  Patient states he has 1 sexual partner and has vaginal intercourse.      Objective:     Past Medical History:    Diabetes (HCC)    Essential hypertension    Hyperlipidemia    SHARIF (obstructive sleep apnea)    AHI 20.8  Supine AHI 29 non-supine AHI 9 Sao2 Henrry 77%               Past Surgical History:   Procedure Laterality Date    Appendectomy      Elbow surgery Right age 9    pins placed    Knee surgery Right 2004    Scope hx and possible R medial meniscectomy    Other surgical history  01/2019    L shoulder arthroscopy with RCR    Other surgical history  10/2019    R knee arthroscopy                Social History     Socioeconomic History    Marital status: Unknown   Tobacco Use    Smoking status: Never     Passive exposure: Never    Smokeless tobacco: Never   Vaping Use    Vaping status: Never Used   Substance and Sexual Activity    Alcohol use: Yes     Comment: occ    Drug use: Not Currently     Frequency: 0.5 times per week     Types: Cannabis   Other Topics Concern    Caffeine Concern Yes     Comment: occ    Exercise No    Seat Belt Yes    Special Diet No    Stress Concern No    Weight Concern Yes     Social Drivers of Health     Food Insecurity: No Food Insecurity (3/17/2021)    Received from [facility]    Food Insecurity     Within the past 30 days, I worried whether my food would run out before I got money to buy more. / En los últimos 30 días, me preocupó que la comida se podía acabar antes de tener dinero para  compr...: Never true / Nunca     Within the past 30 days, the food that I bought just didn't last, and I didn't have money to get more. / En los últimos 30 días, La comida que compré no rindió lo suficiente, y no tenía dinero para...: Never true / Nunca              Review of Systems   Constitutional: Negative.    HENT: Negative.     Eyes: Negative.    Respiratory: Negative.     Cardiovascular: Negative.    Gastrointestinal: Negative.    Endocrine: Negative.    Genitourinary:  Positive for urgency. Negative for dysuria and penile discharge.   Musculoskeletal: Negative.    Allergic/Immunologic: Negative.    Neurological: Negative.    Hematological: Negative.    Psychiatric/Behavioral: Negative.         Positive for stated complaint: uti  Other systems are as noted in HPI.  Constitutional and vital signs reviewed.      All other systems reviewed and negative except as noted above.                  Physical Exam    ED Triage Vitals [05/28/25 1141]   /89   Pulse 72   Resp 18   Temp 98.3 °F (36.8 °C)   Temp src Oral   SpO2 99 %   O2 Device None (Room air)       Current Vitals:   Vital Signs  BP: 159/89  Pulse: 72  Resp: 18  Temp: 98.3 °F (36.8 °C)  Temp src: Oral    Oxygen Therapy  SpO2: 99 %  O2 Device: None (Room air)            Physical Exam  Vitals and nursing note reviewed.   Constitutional:       Appearance: Normal appearance. He is normal weight.   HENT:      Head: Normocephalic and atraumatic.      Right Ear: External ear normal.      Left Ear: External ear normal.   Eyes:      Extraocular Movements: Extraocular movements intact.      Conjunctiva/sclera: Conjunctivae normal.      Pupils: Pupils are equal, round, and reactive to light.   Pulmonary:      Effort: Pulmonary effort is normal.   Neurological:      Mental Status: He is alert.   Psychiatric:         Mood and Affect: Mood normal.               ED Course  Labs Reviewed   POCT GLUCOSE - Normal   [facility] POCT URINALYSIS DIPSTICK   URINE CULTURE, ROUTINE    CHLAMYDIA/GONOCOCCUS, OSCAR                            MDM     49-year-old male with diabetes, hypertension, dyslipidemia presents today with complaints of urinary urgency.  Patient states it does not hurt or burn when he urinates but he feels like he has to urinate.  Patient states he has been using Mounjaro for diabetes.  Patient denies any STI exposure.  Patient states he has 1 sexual partner and has vaginal intercourse.  Vital signs: Please see EMR.  Physical exam: Please see exam.  Differential diagnosis: Dysuria, UTI, cystitis, glucose urea, STI.  Recent Results (from the past 24 hours)   POCT Glucose    Collection Time: 05/28/25 12:47 PM   Result Value Ref Range    POC Glucose 91 70 - 99 mg/dL   POCT Urinalysis Dipstick    Collection Time: 05/28/25 12:49 PM   Result Value Ref Range    Urine Color Yellow Yellow    Urine Clarity Clear Clear    Specific Gravity, Urine 1.015 1.005 - 1.030    PH, Urine 6.5 5.0 - 8.0    Protein urine Negative Negative mg/dL    Glucose, Urine Negative Negative mg/dL    Ketone, Urine Negative Negative mg/dL    Bilirubin, Urine Negative Negative    Blood, Urine Negative Negative    Nitrite Urine Negative Negative    Urobilinogen urine <2.0 <2.0 mg/dL    Leukocyte esterase urine Negative Negative     Based on physical exam and HPI will diagnose with urinary urgency after evaluating urinalysis.  Patient will be prescribed Pyridium twice daily for 3 days.  We will notify patient of any abnormalities with the urine culture that indicates need to change treatment plan.  ED precautions given.        Medical Decision Making  49-year-old male with diabetes, hypertension, dyslipidemia presents today with complaints of urinary urgency.  Patient states it does not hurt or burn when he urinates but he feels like he has to urinate.  Patient states he has been using Mounjaro for diabetes.  Patient denies any STI exposure.  Patient states he has 1 sexual partner and has vaginal  intercourse.    Problems Addressed:  Urinary urgency: acute illness or injury    Amount and/or Complexity of Data Reviewed  Labs: ordered. Decision-making details documented in ED Course.     Details: Recent Results (from the past 24 hours)  -POCT Glucose:   Collection Time: 05/28/25 12:47 PM       Result                      Value             Ref Range           POC Glucose                 91                70 - 99 mg/dL  -POCT Urinalysis Dipstick:   Collection Time: 05/28/25 12:49 PM       Result                      Value             Ref Range           Urine Color                 Yellow            Yellow              Urine Clarity               Clear             Clear               Specific Gravity, Urine     1.015             1.005 - 1.030       PH, Urine                   6.5               5.0 - 8.0           Protein urine               Negative          Negative mg/*       Glucose, Urine              Negative          Negative mg/*       Ketone, Urine               Negative          Negative mg/*       Bilirubin, Urine            Negative          Negative            Blood, Urine                Negative          Negative            Nitrite Urine               Negative          Negative            Urobilinogen urine          <2.0              <2.0 mg/dL          Leukocyte esterase uri*     Negative          Negative           Risk  Prescription drug management.        Disposition and Plan     Clinical Impression:  1. Urinary urgency         Disposition:  Discharge  5/28/2025 12:57 pm    Follow-up:  Rosalva Min MD  [address] [facility] [address]  [phone]    In 3 days  As needed          Medications Prescribed:  Current Discharge Medication List        START taking these medications    Details   phenazopyridine 200 MG Oral Tab Take 1 tablet (200 mg total) by mouth 3 (three) times daily as needed for Pain.  Qty: 6 tablet, Refills: 0                   Supplementary Documentation:

## 2025-05-28 NOTE — DISCHARGE INSTRUCTIONS
Increase water intake 2-3 liters daily   You will be notified of any abnormalities with the urine culture that indicates the need to change treatment plan.  Otherwise please take the prescribed medication.  This medication will turn your urine orange as this is a normal side effect of this medication.  He develop any worsening symptoms please go to the ER for further evaluation.

## 2025-05-29 LAB
C TRACH DNA SPEC QL NAA+PROBE: NEGATIVE
N GONORRHOEA DNA SPEC QL NAA+PROBE: NEGATIVE

## 2025-08-31 ENCOUNTER — TELEMEDICINE (OUTPATIENT)
Dept: TELEHEALTH | Age: 50
End: 2025-08-31

## 2025-08-31 DIAGNOSIS — K08.89 TOOTH PAIN: Primary | ICD-10-CM

## (undated) NOTE — MR AVS SNAPSHOT
[place]  [address] [facility] [address]  [address] [facility] [address] [phone]               Thank you for choosing us for your health care visit with Clemmie Runner, MD.  We are glad to serve you and happy to provide you with this summa Take 1 tablet (37.5 mg total) by mouth every morning before breakfast.   Commonly known as:  ADIPEX-P                Where to Get Your Medications      You can get these medications from any pharmacy     Bring a paper prescription for each of these medicat

## (undated) NOTE — Clinical Note
2017    Patient: Suha Siegel  : 1975 Visit date: 2017    Dear  Dr. Joseph Jarrett MD,    Thank you for referring Suha Siegel to my practice. Please find my assessment and plan below.            Assessment   Acute posterior anal fissure  (mirna fistula. There are no palpable abscesses or fistulae. Prostate is normal.  The patient has no external hemorrhoids. He may have large internal hemorrhoids based on palpation.   What can be seen of the mucosal surface and anal skin does not appear to be c

## (undated) NOTE — LETTER
Date: 4/26/2024    Patient Name: Juli Santamaria    To Whom it may concern:    The above patient was seen at [facility] for treatment of a medical condition.    This patient should be excused from attending work from 4/25/24 through 4/26/24.    The patient may return to work on 4/27/24 with no limitations or restrictions.    Sincerely,      Rosalva Min MD

## (undated) NOTE — LETTER
04/01/19        [address]      Dear Juanis Byrne records indicate that you have outstanding lab work and or testing that was ordered for you and has not yet been completed: Fasting lab work   To complete the lab w

## (undated) NOTE — MR AVS SNAPSHOT
[facility]  [address] [address] [phone]               Thank you for choosing us for your health care visit with Amparo Geller MD.  We are glad to serve you and happy to provide you with this summary of you cannot go to the bathroom at certain stops. This makes him have functional constipation that gives him erratic and firm bowel movements. The patient's pain and symptoms are severe and debilitating for him. The patient has never had colonoscopy.     Misty Alberts Today's Vital Signs     BP Pulse Height Weight BMI    165/72 mmHg 94 75\" 313 lb 39.12 kg/m2         Current Medications          This list is accurate as of: 5/8/17  5:06 PM.  Always use your most recent med list.                ibuprofen 400 MG Tabs   Ta

## (undated) NOTE — Clinical Note
Thank you for your referral, started on low dose insulin and will see in 2 weeks, very motivated to get DM controlled will keep you posted.

## (undated) NOTE — LETTER
02/15/20        Wanda Dewitt  [address] [place] [address]      Dear Eleazar Pires records indicate that you have outstanding lab work and or testing that was ordered for you and has not yet been completed:  Orders Placed This Encounter      H

## (undated) NOTE — LETTER
Date: 6/3/2023    Patient Name: Reyna Vargas    To Whom it may concern: The above patient was seen at the [facility] for treatment of his medical conditions. This patient's blood pressure is well controlled. His blood pressure was 130/70 today. He is under our care for his diabetes at this time. He has not had any symptomatic hypoglycemic episodes. He is medically cleared to drive and operate machinery.     Sincerely,      Kavita Guerrero MD

## (undated) NOTE — LETTER
Date: 4/9/2018    Patient Name: Mary Kaiser          To Whom it may concern: This letter has been written at the patient's request. The above patient was seen at the [facility] for treatment of a medical condition.     This patient shoul

## (undated) NOTE — LETTER
07/13/20        Albaro Mariee  [address] [facility]      Dear Juanis Byrne records indicate that you have outstanding lab work and or testing that was ordered for you and has not yet been completed: Fasting lab work - Make sure you fas

## (undated) NOTE — Clinical Note
BG at goal, started statin, to have repeat labs and see you in 4 weeks thanks still needs eye have stressed to get this done

## (undated) NOTE — LETTER
06/19/19        Albaro Mariee  [address] [place] [address]      Dear Juanis Byrne records indicate that you have outstanding lab work and or testing that was ordered for you and has not yet been completed: Fasting lab work   To complete the l

## (undated) NOTE — LETTER
05/10/18        Kieran Gonzales [address]      Dear Chi Augustin records indicate that you have outstanding lab work and or testing that was ordered for you and has not yet been completed:          Basic Metabolic Panel (8) [E]

## (undated) NOTE — MR AVS SNAPSHOT
[place]  [address] [facility] [address]  [address] [facility] [address] [phone]               Thank you for choosing us for your health care visit with Bryon Chinchilla MD.  We are glad to serve you and happy to provide you with this summa Assoc Dx:  Routine general medical examination at a health care facility [Z00.00], Uncontrolled type 2 diabetes mellitus with stage 3 chronic kidney disease, without long-term current use of insulin (HCC) [E11.22, E11.65, N18.3]           VITAMIN D, 25-HY SURGERY - INTERNAL [[id number] CUSTOM]  Order #:  [id number]         **REFERRAL REQUEST**    Your physician has referred you to a specialist.  Your physician or the clinic staff will provide you with the phone number you should call to schedule your appointm MetFORMIN HCl 1000 MG Tabs   TAKE ONE TABLET BY MOUTH DAILY WITH BREAKFAST   Commonly known as:  GLUCOPHAGE                   Immunizations Administered in the Office Today     Pneumovax 23 (Lot Mgr)       Virgil     Call the helpdesk for assistance with